# Patient Record
Sex: FEMALE | Race: WHITE | URBAN - METROPOLITAN AREA
[De-identification: names, ages, dates, MRNs, and addresses within clinical notes are randomized per-mention and may not be internally consistent; named-entity substitution may affect disease eponyms.]

---

## 2017-02-09 ENCOUNTER — HOSPITAL ENCOUNTER (OUTPATIENT)
Dept: MAMMOGRAPHY | Age: 52
Discharge: HOME | End: 2017-02-09
Attending: INTERNAL MEDICINE | Admitting: INTERNAL MEDICINE

## 2017-02-09 DIAGNOSIS — Z12.31 ENCOUNTER FOR SCREENING MAMMOGRAM FOR BREAST CANCER: ICD-10-CM

## 2017-08-21 ENCOUNTER — TELEPHONE (OUTPATIENT)
Dept: INTERNAL MEDICINE CLINIC | Age: 52
End: 2017-08-21

## 2017-10-02 ENCOUNTER — OFFICE VISIT (OUTPATIENT)
Dept: RHEUMATOLOGY | Age: 52
End: 2017-10-02

## 2017-10-02 VITALS
HEIGHT: 67 IN | SYSTOLIC BLOOD PRESSURE: 102 MMHG | BODY MASS INDEX: 20.28 KG/M2 | HEART RATE: 80 BPM | OXYGEN SATURATION: 98 % | DIASTOLIC BLOOD PRESSURE: 68 MMHG | WEIGHT: 129.2 LBS

## 2017-10-02 DIAGNOSIS — M25.50 POLYARTHRALGIA: Primary | ICD-10-CM

## 2017-10-02 DIAGNOSIS — M35.3 POLYMYALGIA RHEUMATICA (HCC): ICD-10-CM

## 2017-10-02 DIAGNOSIS — M25.50 POLYARTHRALGIA: ICD-10-CM

## 2017-10-02 LAB
A/G RATIO: 1.3 (ref 1.1–2.2)
ALBUMIN SERPL-MCNC: 4.2 G/DL (ref 3.4–5)
ALP BLD-CCNC: 73 U/L (ref 40–129)
ALT SERPL-CCNC: 12 U/L (ref 10–40)
ANION GAP SERPL CALCULATED.3IONS-SCNC: 16 MMOL/L (ref 3–16)
AST SERPL-CCNC: 21 U/L (ref 15–37)
BILIRUB SERPL-MCNC: <0.2 MG/DL (ref 0–1)
BUN BLDV-MCNC: 22 MG/DL (ref 7–20)
CALCIUM SERPL-MCNC: 9.9 MG/DL (ref 8.3–10.6)
CHLORIDE BLD-SCNC: 99 MMOL/L (ref 99–110)
CO2: 26 MMOL/L (ref 21–32)
CREAT SERPL-MCNC: 0.7 MG/DL (ref 0.6–1.1)
GFR AFRICAN AMERICAN: >60
GFR NON-AFRICAN AMERICAN: >60
GLOBULIN: 3.3 G/DL
GLUCOSE BLD-MCNC: 93 MG/DL (ref 70–99)
HEPATITIS B CORE IGM ANTIBODY: NORMAL
HEPATITIS B SURFACE ANTIGEN INTERPRETATION: NORMAL
HEPATITIS C ANTIBODY INTERPRETATION: NORMAL
POTASSIUM SERPL-SCNC: 4.4 MMOL/L (ref 3.5–5.1)
RHEUMATOID FACTOR: <10 IU/ML
SODIUM BLD-SCNC: 141 MMOL/L (ref 136–145)
TOTAL PROTEIN: 7.5 G/DL (ref 6.4–8.2)

## 2017-10-02 PROCEDURE — 99244 OFF/OP CNSLTJ NEW/EST MOD 40: CPT | Performed by: INTERNAL MEDICINE

## 2017-10-02 RX ORDER — MELOXICAM 15 MG/1
TABLET ORAL
Refills: 1 | COMMUNITY
Start: 2017-09-16 | End: 2018-05-09 | Stop reason: CLARIF

## 2017-10-02 RX ORDER — LEVOTHYROXINE SODIUM 125 MCG
TABLET ORAL
Refills: 5 | COMMUNITY
Start: 2017-09-28

## 2017-10-02 RX ORDER — PREDNISONE 1 MG/1
TABLET ORAL
Refills: 1 | COMMUNITY
Start: 2017-07-25 | End: 2018-05-07

## 2017-10-02 NOTE — PROGRESS NOTES
10/2/2017  Patient Name: Toshia Lowery  : 1965  Medical Record: [id number]    MEDICATIONS  Current Outpatient Prescriptions   Medication Sig Dispense Refill    SYNTHROID 125 MCG tablet TAKE 1 TABLET DAILY  5    meloxicam (MOBIC) 15 MG tablet TAKE 1 TABLET(S) EVERY DAY BY ORAL ROUTE.  1    predniSONE (DELTASONE) 1 MG tablet TAKE 2 TABLET(S) EVERY DAY BY ORAL ROUTE AS DIRECTED FOR 30 DAYS.  1     No current facility-administered medications for this visit. ALLERGIES  No Known Allergies      Comments  No specialty comments available. REFERRING PHYSICIAN: Glenys Sky MD    HISTORY OF PRESENT ILLNESS  Toshia Lowery is a 45 y.o. female who is being seen for follow up evaluation of  polyarthralgia and polymyalgia rheumatica. She was diagnosed with polymyalgia rheumatica 1 year ago when she presented with pain and stiffness in her whole body which was worse around shoulders and hips. She also had morning stiffness, difficulty opening doorknobs/jar cans. Symptoms were worse in her knees and legs. She denies any headache, vision changes, jaw pain or extremity weakness. She was prescribed prednisone which was slowly tapered off. She is down to prednisone 1 mg per day. She developed recurrence of symptoms with prednisone taper. She is down to prednisone 1 mg per day. She does not remember the exact dose when she noticed recurrence of symptoms. She intermittent pain in her knees and ankles. She has swelling in her ankles. 8 weeks ago she was started on meloxicam 15 mg per day by her primary care physician and was told that she has possible rheumatoid arthritis and was referred to us for further evaluation. Her symptoms have significantly improved on meloxicam.  Stiffness has resolved as well. Records from primary care physician were reviewed. She had recent lab work in 2017 which showed normal CBC, ESR and CRP.   HPI  ROS    REVIEW OF SYSTEMS: Constitutional: No unanticipated weight loss or fevers. No fatigue and malaise. Integumentary: No rash, photosensitivity, malar rash, livedo reticularis, alopecia and Raynaud's symptoms, sclerodactyly, skin tightening  Eyes: negative for dry eyes, visual disturbance and persistent redness, discharge from eyes   ENT: - No tinnitus, loss of hearing, vertigo, or recurrent ear infections.  - No history of nasal/oral ulcers. - No history of sicca symptoms. Cardiovascular: No history of pericarditis, chest pain or murmur or palpitations  Respiratory: No shortness of breath, cough or history of interstitial lung disease. No history of pleurisy. No history of tuberculosis or atypical infections. Gastrointestinal: No history of heart burn, dysphagia or esophageal dysmotility. No change in bowel habits or any inflammatory bowel disease. Genitourinary: No history renal disease, miscarriages. Hematologic/Lymphatic: No abnormal bruising or bleeding, blood clots or swollen lymph nodes. Neurological: No history seizure or focal weakness. No history of neuropathies, paresthesias or hyperesthesias, facial droop, diplopia  Psychiatric: No history of bipolar disease, anxiety, depression  Endocrine: Denies any thyroid / parathyroid disease and osteoporosis  Allergic/Immunologic: No nasal congestion or hives. I have reviewed patients Past medical History, Social History and Family History as mentioned in her chart and this remains unchanged from previous.     Past Medical History:   Diagnosis Date    Polym[facility]algia rheumatica ([facility] [address]     Thyroid disease      Past Surgical History:   Procedure Laterality Date    HERNIA REPAIR      umbilical    KNEE ARTHROSCOPY  01/17/2012    RIGHT, WITH MICROFRACTURE OF FEMORAL CHONDYLE, DEBRIDEMENT OF BONE SPURS    TONSILLECTOMY       Social History     Social History    Marital status:      Spouse name: N/A    Number of children: N/A    Years of education: N/A     Occupational History    Not on file. Social History Main Topics    Smoking status: Current Every Day Smoker     Packs/day: 1.00     Years: 20.00    Smokeless tobacco: Never Used    Alcohol use No    Drug use: No    Sexual activity: Not on file     Other Topics Concern    Not on file     Social History Narrative     Family History   Problem Relation Age of Onset    Cancer Mother     Cancer Father     Cancer Sister          PHYSICAL EXAM   Vitals:    10/02/17 1320   BP: 102/68   Pulse: 80   SpO2: 98%   Weight: 129 lb 3.2 oz (58.6 kg)   Height: 5' 7\" (1.702 m)     Physical Exam  Constitutional:  Well developed, well nourished, no acute distress, non-toxic appearance   Musculoskeletal:    RIGHT  Swell  Tender  ROM  LEFT  Swell  Tender  ROM    DIP2  0  0  Heberden   0  0  Heberden    DIP3  0  0  Heberden   0  0  Heberden    DIP4  0  0  Heberden   0  0  Heberden    DIP5  0  0  Heberden   0  0  Heberden    PIP1  0  0  FULL   0  0  FULL    PIP2  0  0  FULL   0  0  FULL    PIP3  0  0  FULL   0  0  FULL    PIP4  0  0  Bony change   0  0  FULL    PIP5  0  0  FULL   0  0  FULL    MCP1  0  0  FULL   0  0  FULL    MCP2  0  0  FULL   0  0  FULL    MCP3  0  0  FULL   0  0  FULL    MCP4  0  0  FULL   0  0  FULL    MCP5  0  0  FULL   0  0  FULL    Wrist  0  0  FULL   0  0  FULL    Elbow  0  0  FULL   0  0  FULL    Shouldr  0  0  FULL   0  0  FULL    Hip  0  0  FULL   0  0  FULL    Knee  0  0  Crepitus/varus  0  0  Crepitus/varus   Ankle  0  0  FULL   0  0  FULL    MTP1  0  0  FULL   0  0  FULL    MTP2  0  0  FULL   0  0  FULL    MTP3  0  0  FULL   0  0  FULL    MTP4  0  0  FULL   0  0  FULL    MTP5  0  0  FULL   0  0  FULL    IP1  0  0  FULL   0  0  FULL    IP2  0  0  FULL   0  0  FULL    IP3  0  0  FULL   0  0  FULL    IP4  0  0  FULL   0  0  FULL    IP5  0  0  FULL   0 0 FULL     Squaring of b/l cmc joints     Ambulates without assistance, normal gait  Neck: Full ROM, no tenderness, supple   Back- no tenderness.   Eyes:  PERRL, likely diagnosis osteoarthritis involving multiple joints. I doubt she has rheumatoid arthritis or systemic inflammatory disease.  -I will do workup to completely rule out rheumatoid arthritis  -I will check x-rays of hands and knees  -I will continue meloxicam 15 mg per day    -     XR Hand Bilateral Minimum 3 VW; Future  -     XR Knee Left Ap Lateral and Oblique; Future  -     XR Knee Right Ap Lateral and Oblique; Future  -     Cyclic Citrul Peptide Antibody, IgG; Future  -     Hepatitis B Core Antibody, IgM; Future  -     Hepatitis B Surface Antigen; Future  -     Hepatitis C Antibody; Future  -     Rheumatoid Factor; Future  -     Comprehensive Metabolic Panel; Future    Polymyalgia rheumatica ([facility] [address]- she is currently on prednisone 1 mg per day managed by her primary care physician. She denies any headaches, vision changes, jaw pain or extremity weakness. Recent ESR and CRP normal.  -She will follow up with primary care physician for further management of PMR         The patient indicates understanding of these issues and agrees with the plan. Return in about 6 weeks (around 11/13/2017). The risks and benefits of my recommendations, as well as other treatment options, benefits and side effects were discussed with the patient. All questions were answered. I reviewed patient's history, referral documents and electronic medical records  Copy of consult note is being routed electronically/faxed to referring physician         ######################################################################    I thank you for giving me the opportunity to participate in [address] [place] [address]. If you have any questions or concerns please feel free to contact me. I look forward to following  Didier Dewitt along with you. Electronically signed by: Dione Arias MD, 10/2/2017 2:09 PM    Documentation was done using voice recognition dragon software.   Every effort was made to ensure accuracy; however, inadvertent unintentional computerized transcription errors may be present.

## 2017-10-02 NOTE — MR AVS SNAPSHOT
After Visit Summary             Claudy Adan   10/2/2017 1:40 PM   Office Visit    Description:  Female : 1965   Provider:  Dione Arias MD   Department:  [facility]              Your Follow-Up and Future Appointments         Below is a list of your follow-up and future appointments. This may not be a complete list as you may have made appointments directly with providers that we are not aware of or your providers may have made some for you. Please call your providers to confirm appointments. It is important to keep your appointments. Please bring your current insurance card, photo ID, co-pay, and all medication bottles to your appointment. If self-pay, payment is expected at the time of service. Your To-Do List     Future Appointments Provider Department Dept Phone    2017 1:40 PM Dione Arias MD [facility] [phone]    Please arrive 15 minutes prior to appointment time, bring insurance card and photo ID. Future Orders Complete By Expires    Comprehensive Metabolic Panel [OBW47 Custom]  10/2/2017     Cyclic Citrul Peptide Antibody, IgG [ZLG176 Custom]  10/2/2017 10/3/2018    Hepatitis B Core Antibody, IgM [EGS220 Custom]  10/2/2017 10/2/2018    Hepatitis B Surface Antigen [AGX499 Custom]  10/2/2017 10/2/2018    Hepatitis C Antibody [VWQ745 Custom]  10/2/2017 10/2/2018    Rheumatoid Factor [DJK554 Custom]  10/2/2017 10/3/2018    XR Hand Bilateral Minimum 3 VW [FGH5666 Custom]  10/2/2017 10/3/2018    XR Knee Left Ap Lateral and Oblique [PSR0172 Custom]  10/2/2017 10/3/2018    XR Knee Right Ap Lateral and Oblique [YPL7296 Custom]  10/2/2017 10/3/2018    Follow-Up    Return in about 6 weeks (around 2017).          Information from Your Visit        Department     Name Address Phone Fax    [facility] [address] [place] [address] [address] [phone]      You Were Seen for: Comments    Polyarthralgia   [124117]         Vital Signs     Blood Pressure Pulse Height Weight Oxygen Saturation Body Mass Index    102/68 80 5' 7\" (1.702 m) 129 lb 3.2 oz (58.6 kg) 98% 20.24 kg/m2    Smoking Status                   Current Every Day Smoker           Instructions    Continue meloxicam   Check labs and xrays               Today's Medication Changes          These changes are accurate as of: 10/2/17  2:26 PM.  If you have any questions, ask your nurse or doctor. CHANGE how you take these medications           SYNTHROID 125 MCG tablet   Instructions:  TAKE 1 TABLET DAILY   Refills:  5   Generic drug:  levothyroxine   What changed:  Another medication with the same name was removed. Continue taking this medication, and follow the directions you see here. Changed by:  Douglas Albert MD         STOP taking these medications           ibuprofen 600 MG tablet   Commonly known as:  ADVIL;MOTRIN   Stopped by:  Douglas Albert MD               Your Current Medications Are              SYNTHROID 125 MCG tablet TAKE 1 TABLET DAILY    meloxicam (MOBIC) 15 MG tablet TAKE 1 TABLET(S) EVERY DAY BY ORAL ROUTE.    predniSONE (DELTASONE) 1 MG tablet TAKE 2 TABLET(S) EVERY DAY BY ORAL ROUTE AS DIRECTED FOR 30 DAYS. Allergies           No Known Allergies         Additional Information        Basic Information     Date Of Birth Sex Race Ethnicity Preferred Language    1965 Female White Non-/Non  English      Immunizations as of 10/2/2017     Name Date    Influenza Nasal 10/10/2011      Preventive Care        Date Due    Hepatitis C screening is recommended for all adults regardless of risk factors born between [facility] at least once (lifetime) who have never been tested. 1965    HIV screening is recommended for all people regardless of risk factors  aged 15-65 years at least once (lifetime) who have never been HIV tested.  3/17/1978

## 2017-10-04 LAB — CCP IGG ANTIBODIES: 24 UNITS (ref 0–19)

## 2017-10-05 ENCOUNTER — HOSPITAL ENCOUNTER (OUTPATIENT)
Dept: OTHER | Age: 52
Discharge: HOME | End: 2017-10-05
Attending: INTERNAL MEDICINE | Admitting: INTERNAL MEDICINE

## 2017-10-05 DIAGNOSIS — M25.50 POLYARTHRALGIA: ICD-10-CM

## 2017-10-05 NOTE — PROGRESS NOTES
Please call the patient and let her know that x-rays are consistent with osteoarthritis.   Continue same management

## 2017-11-13 ENCOUNTER — HOSPITAL ENCOUNTER (OUTPATIENT)
Dept: GENERAL RADIOLOGY | Age: 52
Discharge: HOME | End: 2017-11-13
Attending: INTERNAL MEDICINE | Admitting: INTERNAL MEDICINE

## 2017-11-13 DIAGNOSIS — Z13.820 ENCOUNTER FOR SCREENING FOR OSTEOPOROSIS: ICD-10-CM

## 2017-11-13 DIAGNOSIS — Z13.820 OSTEOPOROSIS SCREENING: ICD-10-CM

## 2018-02-13 ENCOUNTER — HOSPITAL ENCOUNTER (OUTPATIENT)
Dept: MAMMOGRAPHY | Age: 53
Discharge: HOME | End: 2018-02-13
Attending: INTERNAL MEDICINE | Admitting: INTERNAL MEDICINE

## 2018-02-13 DIAGNOSIS — Z12.31 ENCOUNTER FOR SCREENING MAMMOGRAM FOR BREAST CANCER: ICD-10-CM

## 2018-02-23 ENCOUNTER — HOSPITAL ENCOUNTER (OUTPATIENT)
Dept: OTHER | Age: 53
Discharge: HOME | End: 2018-02-23
Attending: INTERNAL MEDICINE | Admitting: INTERNAL MEDICINE

## 2018-02-23 DIAGNOSIS — R52 PAIN: ICD-10-CM

## 2018-03-14 ENCOUNTER — OFFICE VISIT (OUTPATIENT)
Dept: ORTHOPEDIC SURGERY | Age: 53
End: 2018-03-14

## 2018-03-14 VITALS — WEIGHT: 129.19 LBS | HEIGHT: 67 IN | BODY MASS INDEX: 20.28 KG/M2

## 2018-03-14 DIAGNOSIS — M19.019 ACROMIOCLAVICULAR JOINT ARTHRITIS: Primary | ICD-10-CM

## 2018-03-14 PROCEDURE — 99204 OFFICE O/P NEW MOD 45 MIN: CPT | Performed by: ORTHOPAEDIC SURGERY

## 2018-03-14 NOTE — PROGRESS NOTES
this time  2. PT - in the future  3. Further imaging - MRI right shoulder  4. Follow up - after MRI      [facility]  Board Certified  Ann and Sergio Team Physician

## 2018-04-11 ENCOUNTER — OFFICE VISIT (OUTPATIENT)
Dept: ORTHOPEDIC SURGERY | Age: 53
End: 2018-04-11

## 2018-04-11 VITALS — WEIGHT: 129 LBS | BODY MASS INDEX: 20.25 KG/M2 | HEIGHT: 67 IN

## 2018-04-11 DIAGNOSIS — M19.019 ACROMIOCLAVICULAR JOINT ARTHRITIS: ICD-10-CM

## 2018-04-11 DIAGNOSIS — M75.111 INCOMPLETE TEAR OF RIGHT ROTATOR CUFF: ICD-10-CM

## 2018-04-11 DIAGNOSIS — M75.41 SUBACROMIAL IMPINGEMENT, RIGHT: Primary | ICD-10-CM

## 2018-04-11 PROCEDURE — 99214 OFFICE O/P EST MOD 30 MIN: CPT | Performed by: ORTHOPAEDIC SURGERY

## 2018-04-25 ENCOUNTER — TELEPHONE (OUTPATIENT)
Dept: ORTHOPEDIC SURGERY | Age: 53
End: 2018-04-25

## 2018-05-02 ENCOUNTER — TELEPHONE (OUTPATIENT)
Dept: ORTHOPEDIC SURGERY | Age: 53
End: 2018-05-02

## 2018-05-03 ENCOUNTER — TELEPHONE (OUTPATIENT)
Dept: ORTHOPEDIC SURGERY | Age: 53
End: 2018-05-03

## 2018-05-09 DIAGNOSIS — M75.41 SUBACROMIAL IMPINGEMENT, RIGHT: ICD-10-CM

## 2018-05-09 DIAGNOSIS — M19.019 ACROMIOCLAVICULAR JOINT ARTHRITIS: ICD-10-CM

## 2018-05-09 DIAGNOSIS — M75.111 INCOMPLETE TEAR OF RIGHT ROTATOR CUFF: Primary | ICD-10-CM

## 2018-05-09 RX ORDER — OXYCODONE HYDROCHLORIDE 10 MG/1
10 TABLET ORAL EVERY 8 HOURS PRN
Qty: 21 TABLET | Refills: 0 | Status: SHIPPED | OUTPATIENT
Start: 2018-05-18 | End: 2018-05-25

## 2018-05-09 RX ORDER — MELOXICAM 15 MG/1
15 TABLET ORAL DAILY
Qty: 30 TABLET | Refills: 3 | Status: SHIPPED | OUTPATIENT
Start: 2018-05-18

## 2018-05-09 RX ORDER — MELOXICAM 15 MG/1
15 TABLET ORAL DAILY
Qty: 30 TABLET | Refills: 3 | Status: SHIPPED | OUTPATIENT
Start: 2018-05-18 | End: 2018-05-09 | Stop reason: CLARIF

## 2018-05-09 RX ORDER — OXYCODONE HYDROCHLORIDE 10 MG/1
10 TABLET ORAL EVERY 8 HOURS PRN
Qty: 21 TABLET | Refills: 0 | Status: SHIPPED | OUTPATIENT
Start: 2018-05-18 | End: 2018-05-09 | Stop reason: CLARIF

## 2018-05-18 ENCOUNTER — HOSPITAL ENCOUNTER (OUTPATIENT)
Dept: SURGERY | Age: 53
Discharge: HOME | End: 2018-05-18
Attending: ORTHOPAEDIC SURGERY | Admitting: ORTHOPAEDIC SURGERY

## 2018-05-18 VITALS
TEMPERATURE: 97.4 F | WEIGHT: 136.19 LBS | RESPIRATION RATE: 15 BRPM | SYSTOLIC BLOOD PRESSURE: 95 MMHG | DIASTOLIC BLOOD PRESSURE: 67 MMHG | OXYGEN SATURATION: 96 % | HEIGHT: 66 IN | HEART RATE: 67 BPM | BODY MASS INDEX: 21.89 KG/M2

## 2018-05-18 RX ORDER — SODIUM CHLORIDE, SODIUM LACTATE, POTASSIUM CHLORIDE, CALCIUM CHLORIDE 600; 310; 30; 20 MG/100ML; MG/100ML; MG/100ML; MG/100ML
INJECTION, SOLUTION INTRAVENOUS CONTINUOUS
Status: DISCONTINUED | OUTPATIENT
Start: 2018-05-18 | End: 2018-05-19 | Stop reason: HOSPADM

## 2018-05-18 RX ORDER — HYDRALAZINE HYDROCHLORIDE 20 MG/ML
5 INJECTION INTRAMUSCULAR; INTRAVENOUS EVERY 10 MIN PRN
Status: DISCONTINUED | OUTPATIENT
Start: 2018-05-18 | End: 2018-05-19 | Stop reason: HOSPADM

## 2018-05-18 RX ORDER — MEPERIDINE HYDROCHLORIDE 25 MG/ML
12.5 INJECTION INTRAMUSCULAR; INTRAVENOUS; SUBCUTANEOUS EVERY 5 MIN PRN
Status: DISCONTINUED | OUTPATIENT
Start: 2018-05-18 | End: 2018-05-19 | Stop reason: HOSPADM

## 2018-05-18 RX ORDER — ACETAMINOPHEN 325 MG/1
650 TABLET ORAL EVERY 4 HOURS PRN
Status: DISCONTINUED | OUTPATIENT
Start: 2018-05-18 | End: 2018-05-19 | Stop reason: HOSPADM

## 2018-05-18 RX ORDER — CEFAZOLIN SODIUM 2 G/100ML
2 INJECTION, SOLUTION INTRAVENOUS
Status: COMPLETED | OUTPATIENT
Start: 2018-05-18 | End: 2018-05-18

## 2018-05-18 RX ORDER — LIDOCAINE HYDROCHLORIDE 10 MG/ML
1 INJECTION, SOLUTION EPIDURAL; INFILTRATION; INTRACAUDAL; PERINEURAL
Status: ACTIVE | OUTPATIENT
Start: 2018-05-18 | End: 2018-05-18

## 2018-05-18 RX ORDER — SODIUM CHLORIDE 0.9 % (FLUSH) 0.9 %
10 SYRINGE (ML) INJECTION PRN
Status: DISCONTINUED | OUTPATIENT
Start: 2018-05-18 | End: 2018-05-19 | Stop reason: HOSPADM

## 2018-05-18 RX ORDER — LABETALOL HYDROCHLORIDE 5 MG/ML
5 INJECTION, SOLUTION INTRAVENOUS EVERY 10 MIN PRN
Status: DISCONTINUED | OUTPATIENT
Start: 2018-05-18 | End: 2018-05-19 | Stop reason: HOSPADM

## 2018-05-18 RX ORDER — OXYCODONE HYDROCHLORIDE AND ACETAMINOPHEN 5; 325 MG/1; MG/1
1 TABLET ORAL
Status: COMPLETED | OUTPATIENT
Start: 2018-05-18 | End: 2018-05-18

## 2018-05-18 RX ORDER — SODIUM CHLORIDE 0.9 % (FLUSH) 0.9 %
10 SYRINGE (ML) INJECTION EVERY 12 HOURS SCHEDULED
Status: DISCONTINUED | OUTPATIENT
Start: 2018-05-18 | End: 2018-05-19 | Stop reason: HOSPADM

## 2018-05-18 RX ORDER — HYDROMORPHONE HCL 110MG/55ML
0.5 PATIENT CONTROLLED ANALGESIA SYRINGE INTRAVENOUS EVERY 5 MIN PRN
Status: DISCONTINUED | OUTPATIENT
Start: 2018-05-18 | End: 2018-05-19 | Stop reason: HOSPADM

## 2018-05-18 RX ORDER — ONDANSETRON 2 MG/ML
4 INJECTION INTRAMUSCULAR; INTRAVENOUS
Status: ACTIVE | OUTPATIENT
Start: 2018-05-18 | End: 2018-05-18

## 2018-05-18 RX ADMIN — SODIUM CHLORIDE, SODIUM LACTATE, POTASSIUM CHLORIDE, CALCIUM CHLORIDE: 600; 310; 30; 20 INJECTION, SOLUTION INTRAVENOUS at 06:23

## 2018-05-18 RX ADMIN — SODIUM CHLORIDE, SODIUM LACTATE, POTASSIUM CHLORIDE, CALCIUM CHLORIDE: 600; 310; 30; 20 INJECTION, SOLUTION INTRAVENOUS at 08:52

## 2018-05-18 RX ADMIN — OXYCODONE HYDROCHLORIDE AND ACETAMINOPHEN 1 TABLET: 5; 325 TABLET ORAL at 09:30

## 2018-05-18 RX ADMIN — CEFAZOLIN SODIUM 2 G: 2 INJECTION, SOLUTION INTRAVENOUS at 06:56

## 2018-05-18 RX ADMIN — Medication 0.5 MG: at 08:51

## 2018-05-18 ASSESSMENT — PAIN SCALES - GENERAL
PAINLEVEL_OUTOF10: 5
PAINLEVEL_OUTOF10: 3
PAINLEVEL_OUTOF10: 4

## 2018-05-18 ASSESSMENT — PAIN DESCRIPTION - ORIENTATION: ORIENTATION: RIGHT

## 2018-05-18 ASSESSMENT — PAIN DESCRIPTION - LOCATION: LOCATION: SHOULDER

## 2018-05-18 ASSESSMENT — PAIN - FUNCTIONAL ASSESSMENT: PAIN_FUNCTIONAL_ASSESSMENT: 0-10

## 2018-05-18 ASSESSMENT — LIFESTYLE VARIABLES: SMOKING_STATUS: 1

## 2018-05-23 ENCOUNTER — OFFICE VISIT (OUTPATIENT)
Dept: ORTHOPEDIC SURGERY | Age: 53
End: 2018-05-23

## 2018-05-23 VITALS — BODY MASS INDEX: 21.97 KG/M2 | HEIGHT: 66 IN | WEIGHT: 136.69 LBS

## 2018-05-23 DIAGNOSIS — M25.511 ACUTE PAIN OF RIGHT SHOULDER: Primary | ICD-10-CM

## 2018-05-23 PROCEDURE — 99024 POSTOP FOLLOW-UP VISIT: CPT | Performed by: ORTHOPAEDIC SURGERY

## 2018-05-29 ENCOUNTER — HOSPITAL ENCOUNTER (OUTPATIENT)
Dept: PHYSICAL THERAPY | Age: 53
Discharge: HOME | End: 2018-05-31
Admitting: ORTHOPAEDIC SURGERY

## 2018-05-31 ENCOUNTER — HOSPITAL ENCOUNTER (OUTPATIENT)
Dept: PHYSICAL THERAPY | Age: 53
Discharge: HOME | End: 2018-06-30
Admitting: ORTHOPAEDIC SURGERY

## 2018-06-01 ENCOUNTER — HOSPITAL ENCOUNTER (OUTPATIENT)
Dept: OTHER | Age: 53
Discharge: HOME | End: 2018-06-01
Attending: ORTHOPAEDIC SURGERY | Admitting: ORTHOPAEDIC SURGERY

## 2018-06-05 ENCOUNTER — HOSPITAL ENCOUNTER (OUTPATIENT)
Dept: PHYSICAL THERAPY | Age: 53
Discharge: HOME | End: 2018-06-06
Admitting: ORTHOPAEDIC SURGERY

## 2018-06-07 ENCOUNTER — TELEPHONE (OUTPATIENT)
Dept: ORTHOPEDIC SURGERY | Age: 53
End: 2018-06-07

## 2018-06-07 ENCOUNTER — HOSPITAL ENCOUNTER (OUTPATIENT)
Dept: PHYSICAL THERAPY | Age: 53
Discharge: HOME | End: 2018-06-08
Admitting: ORTHOPAEDIC SURGERY

## 2018-06-12 ENCOUNTER — HOSPITAL ENCOUNTER (OUTPATIENT)
Dept: PHYSICAL THERAPY | Age: 53
Discharge: HOME | End: 2018-06-13
Admitting: ORTHOPAEDIC SURGERY

## 2018-06-14 ENCOUNTER — HOSPITAL ENCOUNTER (OUTPATIENT)
Dept: PHYSICAL THERAPY | Age: 53
Discharge: HOME | End: 2018-06-15
Admitting: ORTHOPAEDIC SURGERY

## 2018-06-19 ENCOUNTER — HOSPITAL ENCOUNTER (OUTPATIENT)
Dept: PHYSICAL THERAPY | Age: 53
Discharge: HOME | End: 2018-06-20
Admitting: ORTHOPAEDIC SURGERY

## 2018-06-20 ENCOUNTER — OFFICE VISIT (OUTPATIENT)
Dept: ORTHOPEDIC SURGERY | Age: 53
End: 2018-06-20

## 2018-06-20 VITALS — BODY MASS INDEX: 22.04 KG/M2 | HEIGHT: 66 IN | WEIGHT: 137.13 LBS

## 2018-06-20 DIAGNOSIS — M75.41 SUBACROMIAL IMPINGEMENT, RIGHT: Primary | ICD-10-CM

## 2018-06-20 DIAGNOSIS — M75.111 INCOMPLETE TEAR OF RIGHT ROTATOR CUFF: ICD-10-CM

## 2018-06-20 DIAGNOSIS — M19.019 ACROMIOCLAVICULAR JOINT ARTHRITIS: ICD-10-CM

## 2018-06-20 PROCEDURE — 99024 POSTOP FOLLOW-UP VISIT: CPT | Performed by: ORTHOPAEDIC SURGERY

## 2018-06-21 ENCOUNTER — HOSPITAL ENCOUNTER (OUTPATIENT)
Dept: PHYSICAL THERAPY | Age: 53
Discharge: HOME | End: 2018-06-22
Admitting: ORTHOPAEDIC SURGERY

## 2018-06-26 ENCOUNTER — HOSPITAL ENCOUNTER (OUTPATIENT)
Dept: PHYSICAL THERAPY | Age: 53
Discharge: HOME | End: 2018-06-27
Admitting: ORTHOPAEDIC SURGERY

## 2018-06-28 ENCOUNTER — HOSPITAL ENCOUNTER (OUTPATIENT)
Dept: PHYSICAL THERAPY | Age: 53
Discharge: HOME | End: 2018-06-29
Admitting: ORTHOPAEDIC SURGERY

## 2018-07-01 ENCOUNTER — HOSPITAL ENCOUNTER (OUTPATIENT)
Dept: OTHER | Age: 53
Discharge: HOME | End: 2018-07-31
Attending: ORTHOPAEDIC SURGERY | Admitting: ORTHOPAEDIC SURGERY

## 2018-07-03 ENCOUNTER — HOSPITAL ENCOUNTER (OUTPATIENT)
Dept: PHYSICAL THERAPY | Age: 53
Discharge: HOME | End: 2018-07-04
Admitting: ORTHOPAEDIC SURGERY

## 2018-07-03 NOTE — FLOWSHEET NOTE
[address] [place] [address]  Phone: [phone] Fax: [phone]      Physical Therapy Daily Treatment Note  Date:  7/3/2018    Patient Name:  Eri Green    :  1965  MRN: [id number]  Restrictions/Precautions:    Medical/Treatment Diagnosis Information:  · Diagnosis: Acute pain of right shoulder  - Primary   · Treatment Diagnosis: Acute pain of right shoulder  - Primary   Insurance/Certification information:     Physician Information:  Referring Practitioner: Dr Garo Rios of care signed (Y/N):     Date of Patient follow up with Physician:     G-Code (if applicable):      Date G-Code Applied:    PT G-Codes  Functional Assessment Tool Used: DASH  Score: 72  Functional Limitation: Carrying, moving and handling objects  Carrying, Moving and Handling Objects Current Status (): At least 60 percent but less than 80 percent impaired, limited or restricted  Carrying, Moving and Handling Objects Goal Status (): At least 20 percent but less than 40 percent impaired, limited or restricted    Progress Note: [x]  Yes  []  No  Next due by: Visit #10      Latex Allergy:  [x]NO      []YES  Preferred Language for Healthcare:   [x]English       []other:    Visit #u   Insurance Allowable   10 20     Pain level:  0-4/10     SUBJECTIVE:  Patient reports doing well, no ache with sleeping.          OBJECTIVE:   Observation:   Test measurements:  Active flexion 130 w compensatory hike,  PROM flex 145, ABd 120, ER 60, IR 30    RESTRICTIONS/PRECAUTIONS: R shoulder scope, aug repair, DCE, SAD 18    Exercises/Interventions:   Therapeutic Ex Sets/sec Reps Notes   UBE 5 min     Cane AAROM flex/press 1 20 Wand ER/flex   3 way Isomet T- band Row/pinch 1 20 2.5 pl   T- band lower pinch      T- band ER/IR activation 2 12 Red/blue   Supine balance point 30 sec 3 Red KB   SL ER/punch 3 8 1-0 lb   Prone Rows/HAB/ext      Seat Table slides/ 1 15 Elevated/ bilateral   Seated HH  Depression      Biceps/Tri 1 15 8 lb/blue   IR strap st 30 sec 3    Ball rolls 90 deg Ball codmans    Lawnmower 1 20 5 lb   Multi angle body on arm flexion/abd 1 15          Scaption 60 deg 1 10    Manual Intervention      Shld /GH Mobs      Post Cap mobs 5 min  Grade III/IV   Thoracic/Rib manipualtion      CT MT/Mobs      PROM MT 10 min  R UE   Elbow mobs            NMR re-education      T-spine Ext      GH depress/compress      Scap/GH NMR      Body blade      Wall ball roll      Wall Ball bounce      Ball drops      Nikole Scap Bio          Therapeutic Exercise and NMR EXR  [x] (14273) Provided verbal/tactile cueing for activities related to strengthening, flexibility, endurance, ROM  for improvements in scapular, scapulothoracic and UE control with self care, reaching, carrying, lifting, house/yardwork, driving/computer work.    [] (48639) Provided verbal/tactile cueing for activities related to improving balance, coordination, kinesthetic sense, posture, motor skill, proprioception  to assist with  scapular, scapulothoracic and UE control with self care, reaching, carrying, lifting, house/yardwork, driving/computer work. Therapeutic Activities:    [x] (44793 or 14359) Provided verbal/tactile cueing for activities related to improving balance, coordination, kinesthetic sense, posture, motor skill, proprioception and motor activation to allow for proper function of scapular, scapulothoracic and UE control with self care, carrying, lifting, driving/computer work.      Home Exercise Program:    [x] (40860) Reviewed/Progressed HEP activities related to strengthening, flexibility, endurance, ROM of scapular, scapulothoracic and UE control with self care, reaching, carrying, lifting, house/yardwork, driving/computer work  [] (27721) Reviewed/Progressed HEP activities related to improving balance, coordination, kinesthetic sense, posture, motor skill, proprioception of scapular, scapulothoracic and UE control with self care, reaching, carrying, lifting, house/yardwork, driving/computer work      Manual Treatments:  PROM / STM / Oscillations-Mobs:  G-I, II, III, IV (PA's, Inf., Post.)  [x] (43430) Provided manual therapy to mobilize soft tissue/joints of cervical/CT, scapular GHJ and UE for the purpose of modulating pain, promoting relaxation,  increasing ROM, reducing/eliminating soft tissue swelling/inflammation/restriction, improving soft tissue extensibility and allowing for proper ROM for normal function with self care, reaching, carrying, lifting, house/yardwork, driving/computer work    Modalities:      Charges:  Timed Code Treatment Minutes: 55   Total Treatment Minutes: 55     [] EVAL (LOW) 01994 (typically 20 minutes face-to-face)  [] EVAL (MOD) 77657 (typically 30 minutes face-to-face)  [] EVAL (HIGH) 57769 (typically 45 minutes face-to-face)  [] RE-EVAL     [x] ZO(40392) x  3   [] IONTO  [] NMR (22565) x      [] VASO  [x] Manual (12872) x  1    [] Other:  [] TA x       [] Mech Traction (84468)  [] ES(attended) (69965)      [] ES (un) (47025):     GOALS:      GOALS:  Patient stated goal: back to normal (work and bike)    Therapist goals for Patient:   Short Term Goals: To be achieved in: 2 weeks  1. Independent in HEP and progression per patient tolerance, in order to prevent re-injury. 2. Patient will have a decrease in pain to facilitate improvement in movement, function, and ADLs as indicated by Functional Deficits. Long Term Goals: To be achieved in: 8 weeks  1. Disability index score of 40% or less for the DASH to assist with reaching prior level of function. 2. Patient will demonstrate increased AROM to [facility] to allow for proper joint functioning as indicated by patients Functional Deficits.    3. Patient will demonstrate an increase in Strength to good scapular and core control, w/in 5lbs HHD for UE to allow for proper functional mobility as indicated by patients

## 2018-07-05 ENCOUNTER — HOSPITAL ENCOUNTER (OUTPATIENT)
Dept: PHYSICAL THERAPY | Age: 53
Discharge: HOME | End: 2018-07-06
Admitting: ORTHOPAEDIC SURGERY

## 2018-07-05 NOTE — PLAN OF CARE
Exercise and NMR EXR  [x] (37754) Provided verbal/tactile cueing for activities related to strengthening, flexibility, endurance, ROM  for improvements in scapular, scapulothoracic and UE control with self care, reaching, carrying, lifting, house/yardwork, driving/computer work.    [] (53115) Provided verbal/tactile cueing for activities related to improving balance, coordination, kinesthetic sense, posture, motor skill, proprioception  to assist with  scapular, scapulothoracic and UE control with self care, reaching, carrying, lifting, house/yardwork, driving/computer work. Therapeutic Activities:    [x] (42243 or 13986) Provided verbal/tactile cueing for activities related to improving balance, coordination, kinesthetic sense, posture, motor skill, proprioception and motor activation to allow for proper function of scapular, scapulothoracic and UE control with self care, carrying, lifting, driving/computer work.      Home Exercise Program:    [x] (95815) Reviewed/Progressed HEP activities related to strengthening, flexibility, endurance, ROM of scapular, scapulothoracic and UE control with self care, reaching, carrying, lifting, house/yardwork, driving/computer work  [] (72052) Reviewed/Progressed HEP activities related to improving balance, coordination, kinesthetic sense, posture, motor skill, proprioception of scapular, scapulothoracic and UE control with self care, reaching, carrying, lifting, house/yardwork, driving/computer work      Manual Treatments:  PROM / STM / Oscillations-Mobs:  G-I, II, III, IV (PA's, Inf., Post.)  [x] (87826) Provided manual therapy to mobilize soft tissue/joints of cervical/CT, scapular GHJ and UE for the purpose of modulating pain, promoting relaxation,  increasing ROM, reducing/eliminating soft tissue swelling/inflammation/restriction, improving soft tissue extensibility and allowing for proper ROM for normal function with self care, reaching, carrying, lifting, house/yardwork,

## 2018-07-10 ENCOUNTER — HOSPITAL ENCOUNTER (OUTPATIENT)
Dept: PHYSICAL THERAPY | Age: 53
Discharge: HOME | End: 2018-07-11
Admitting: ORTHOPAEDIC SURGERY

## 2018-07-10 NOTE — FLOWSHEET NOTE
[address] [place], [place] [address] [address]  Phone: [phone] Fax: [phone]            Physical Therapy Daily Treatment Note  Date:  7/10/2018    Patient Name:  Bridget Canseco    :  1965  MRN: [id number]  Restrictions/Precautions:    Medical/Treatment Diagnosis Information:  · Diagnosis: Acute pain of right shoulder  - Primary   · Treatment Diagnosis: Acute pain of right shoulder  - Primary   Insurance/Certification information:     Physician Information:  Referring Practitioner: Dr Antonio Jackson of care signed (Y/N):     Date of Patient follow up with Physician:     G-Code (if applicable):      Date G-Code Applied:    PT G-Codes  Functional Assessment Tool Used: DASH  Score: 11  Functional Limitation: Carrying, moving and handling objects  Carrying, Moving and Handling Objects Current Status (): At least 20 percent but less than 0 percent impaired, limited or restricted  Carrying, Moving and Handling Objects Goal Status (): At least 20 percent but less than 0 percent impaired, limited or restricted    Progress Note: [x]  Yes  []  No  Next due by: Visit #10      Latex Allergy:  [x]NO      []YES  Preferred Language for Healthcare:   [x]English       []other:    Visit #u   Insurance Allowable   12 20     Pain level:  0-4/10     SUBJECTIVE:  Patient reports doing well, no ache with sleeping. Mild soreness after last session. Shoulder continues to feel stiff in am, function improving everyday.   IR slowly improving        OBJECTIVE:   Observation:   Test measurements:  Active flexion 150 w compensatory hike,  PROM flex 155, ABd 120, ER 80, IR 34, functional IR to L3    RESTRICTIONS/PRECAUTIONS: R shoulder scope, aug repair, DCE, SAD 18    Exercises/Interventions:   Therapeutic Ex Sets/sec Reps Notes   UBE 5 min     Cane AAROM flex/press 1 20 Wand ER/flex   3 way Isomet T- band Row/pinch 1 20 3.0 pl   T- band lower pinch T- band ER/IR activation 2 12 Red/blue   Supine balance point 60 sec 3 Red KB   SL ER/punch 3 8 2-0 lb   Prone Rows/HAB/ext      Seat Table slides/ 1 15 Elevated/ bilateral   Standing HAB 2 12 red   Biceps/Tri 1 15 8 lb/blue   IR strap st 30 sec 3    Ball rolls 90 deg Ball codmans    Lawnmower 1 20 5 lb   Multi angle body on arm flexion/abd 1 15    Tx open book st 1 10    Scaption/abduction  1 10    Manual Intervention      Shld /GH Mobs      Post Cap mobs 6 min  Grade III/IV   Thoracic/Rib manipualtion      CT MT/Mobs      PROM MT 11 min  R UE   Elbow mobs            NMR re-education      T-spine Ext      GH depress/compress      Scap/GH NMR      Body blade      Wall ball roll      Wall Ball bounce      Ball drops      Nikole Scap Bio          Therapeutic Exercise and NMR EXR  [x] (46983) Provided verbal/tactile cueing for activities related to strengthening, flexibility, endurance, ROM  for improvements in scapular, scapulothoracic and UE control with self care, reaching, carrying, lifting, house/yardwork, driving/computer work.    [] (84927) Provided verbal/tactile cueing for activities related to improving balance, coordination, kinesthetic sense, posture, motor skill, proprioception  to assist with  scapular, scapulothoracic and UE control with self care, reaching, carrying, lifting, house/yardwork, driving/computer work. Therapeutic Activities:    [x] (99884 or 48994) Provided verbal/tactile cueing for activities related to improving balance, coordination, kinesthetic sense, posture, motor skill, proprioception and motor activation to allow for proper function of scapular, scapulothoracic and UE control with self care, carrying, lifting, driving/computer work.      Home Exercise Program:    [x] (10960) Reviewed/Progressed HEP activities related to strengthening, flexibility, endurance, ROM of scapular, scapulothoracic and UE control with self care, reaching, carrying, lifting, house/yardwork,

## 2018-07-12 ENCOUNTER — HOSPITAL ENCOUNTER (OUTPATIENT)
Dept: PHYSICAL THERAPY | Age: 53
Discharge: HOME | End: 2018-07-13
Admitting: ORTHOPAEDIC SURGERY

## 2018-07-12 NOTE — FLOWSHEET NOTE
[address] [place] [address] Timo Jeronimo  Phone: [phone] Fax: [phone]            Physical Therapy Daily Treatment Note  Date:  2018    Patient Name:  Darylene Rancher    :  1965  MRN: [id number]  Restrictions/Precautions:    Medical/Treatment Diagnosis Information:  · Diagnosis: Acute pain of right shoulder  - Primary   · Treatment Diagnosis: Acute pain of right shoulder  - Primary   Insurance/Certification information:     Physician Information:  Referring Practitioner: Dr Fariba Flannery of care signed (Y/N):     Date of Patient follow up with Physician:     G-Code (if applicable):      Date G-Code Applied:    PT G-Codes  Functional Assessment Tool Used: DASH  Score: 11  Functional Limitation: Carrying, moving and handling objects  Carrying, Moving and Handling Objects Current Status (): At least 20 percent but less than 0 percent impaired, limited or restricted  Carrying, Moving and Handling Objects Goal Status (): At least 20 percent but less than 0 percent impaired, limited or restricted    Progress Note: [x]  Yes  []  No  Next due by: Visit #10      Latex Allergy:  [x]NO      []YES  Preferred Language for Healthcare:   [x]English       []other:    Visit #u   Insurance Allowable   13 20     Pain level:  0-4/10     SUBJECTIVE:  Patient reports doing well, no ache with sleeping. Decreased ache overall.       OBJECTIVE:   Observation:   Test measurements:  Active flexion 150 w compensatory hike,  PROM flex 155, ABd 120, ER 80, IR 34, functional IR to L3    RESTRICTIONS/PRECAUTIONS: R shoulder scope, aug repair, DCE, SAD 18    Exercises/Interventions:   Therapeutic Ex Sets/sec Reps Notes   UBE 5 min     Cane AAROM flex/press 1 20 Wand ER/flex   3 way Isomet T- band Row/pinch 1 20 3.0 pl   T- band lower pinch      T- band ER/IR activation 2 12 Red/blue   Supine balance point 60 sec 3 Red KB   SL ER/punch 3 8 2-0 lb   Prone Rows/HAB/ext      Seat Table slides/ 1 15 Elevated/ bilateral   Standing HAB 2 12 red   Biceps/Tri 1 15 8 lb/blue   IR strap st 30 sec 3    Ball rolls 90 deg Ball SpaceIL    Lawnmower 1 20 5 lb   Multi angle body on arm flexion/abd 1 15    Tx open book st 1 10    tx clock rotion 1 6 cw/ccw   Quad flex/hab 2 5 ea   Scaption/abduction  1 10    Manual Intervention      Shld /GH Mobs      Post Cap mobs 3 min  Grade III/IV   Thoracic/Rib manipualtion      CT MT/Mobs      PROM MT 10min  R UE   Elbow mobs            NMR re-education      T-spine Ext      GH depress/compress      Scap/GH NMR      Body blade      Wall ball roll      Wall Ball bounce      Ball drops      Nikole Scap Bio          Therapeutic Exercise and NMR EXR  [x] (80683) Provided verbal/tactile cueing for activities related to strengthening, flexibility, endurance, ROM  for improvements in scapular, scapulothoracic and UE control with self care, reaching, carrying, lifting, house/yardwork, driving/computer work.    [] (03627) Provided verbal/tactile cueing for activities related to improving balance, coordination, kinesthetic sense, posture, motor skill, proprioception  to assist with  scapular, scapulothoracic and UE control with self care, reaching, carrying, lifting, house/yardwork, driving/computer work. Therapeutic Activities:    [x] (61894 or 56242) Provided verbal/tactile cueing for activities related to improving balance, coordination, kinesthetic sense, posture, motor skill, proprioception and motor activation to allow for proper function of scapular, scapulothoracic and UE control with self care, carrying, lifting, driving/computer work.      Home Exercise Program:    [x] (26251) Reviewed/Progressed HEP activities related to strengthening, flexibility, endurance, ROM of scapular, scapulothoracic and UE control with self care, reaching, carrying, lifting, house/yardwork, driving/computer work  [] (53211) Reviewed/Progressed HEP Strength to good scapular and core control, w/in 5lbs HHD for UE to allow for proper functional mobility as indicated by patients Functional Deficits. 75% MET  4. Patient will return to reaching functional activities without increased symptoms or restriction. 75% MET  5. Return to work(patient specific functional goal)  Not MET       Progression Towards Functional goals:  [x] Patient is progressing as expected towards functional goals listed. [] Progression is slowed due to complexities listed. [] Progression has been slowed due to co-morbidities. [] Plan just implemented, too soon to assess goals progression  [] Other:     ASSESSMENT:  ROM doing well today, abd and IR slightly limited, functional elevation improving. Progress as padilla   .     Return to Play: (if applicable)   []  Stage 1: Intro to Strength   []  Stage 2: Dynamic Strength and Intro to Plyometrics   []  Stage 3: Advanced Plyometrics and Intro to Throwing   []  Stage 4: Sport specific Training/Return to Sport     []  Ready to Return to Play, [employer]   []  Not Ready for Return to Sports   Comments:      Treatment/Activity Tolerance:  [x] Patient tolerated treatment well [] Patient limited by fatique  [] Patient limited by pain  [] Patient limited by other medical complications  [] Other:     Prognosis: [x] Good [] Fair  [] Poor    Patient Requires Follow-up: [x] Yes  [] No    PLAN: Cont 1-2 x per week for up to 5 weeks  [x] Continue per plan of care [] Alter current plan (see comments)  [] Plan of care initiated [] Hold pending MD visit [] Discharge    Electronically signed by: Keegna Chen PT

## 2018-07-18 ENCOUNTER — OFFICE VISIT (OUTPATIENT)
Dept: ORTHOPEDIC SURGERY | Age: 53
End: 2018-07-18

## 2018-07-18 ENCOUNTER — HOSPITAL ENCOUNTER (OUTPATIENT)
Dept: PHYSICAL THERAPY | Age: 53
Discharge: HOME | End: 2018-07-19
Admitting: ORTHOPAEDIC SURGERY

## 2018-07-18 DIAGNOSIS — M75.111 INCOMPLETE TEAR OF RIGHT ROTATOR CUFF: Primary | ICD-10-CM

## 2018-07-18 DIAGNOSIS — M19.019 ACROMIOCLAVICULAR JOINT ARTHRITIS: ICD-10-CM

## 2018-07-18 DIAGNOSIS — M75.41 SUBACROMIAL IMPINGEMENT, RIGHT: ICD-10-CM

## 2018-07-18 PROCEDURE — 99024 POSTOP FOLLOW-UP VISIT: CPT | Performed by: ORTHOPAEDIC SURGERY

## 2018-07-18 NOTE — FLOWSHEET NOTE
[address] [place] [address]  Phone: [phone] Fax: [phone]            Physical Therapy Daily Treatment Note  Date:  2018    Patient Name:  Jerson Babcock    :  1965  MRN: [id number]  Restrictions/Precautions:    Medical/Treatment Diagnosis Information:  · Diagnosis: Acute pain of right shoulder  - Primary   · Treatment Diagnosis: Acute pain of right shoulder  - Primary   Insurance/Certification information:     Physician Information:  Referring Practitioner: Dr Jamari Donaldson of care signed (Y/N):     Date of Patient follow up with Physician:     G-Code (if applicable):      Date G-Code Applied:    PT G-Codes  Functional Assessment Tool Used: DASH  Score: 11  Functional Limitation: Carrying, moving and handling objects  Carrying, Moving and Handling Objects Current Status (): At least 20 percent but less than 0 percent impaired, limited or restricted  Carrying, Moving and Handling Objects Goal Status (): At least 20 percent but less than 0 percent impaired, limited or restricted    Progress Note: [x]  Yes  []  No  Next due by: Visit #10      Latex Allergy:  [x]NO      []YES  Preferred Language for Healthcare:   [x]English       []other:    Visit #u   Insurance Allowable   14 20     Pain level:  0-4/10     SUBJECTIVE:  Patient reports that her shoulder is continuing to slowly make progress. Chief complaint is stiffness in the shoulder especially upon waking.       OBJECTIVE:   Observation:   Test measurements:  Active flexion 150 w compensatory hike,  PROM flex 155, ABd 120, ER 80, IR 34, functional IR to L3    RESTRICTIONS/PRECAUTIONS: R shoulder scope, aug repair, DCE, SAD 18    Exercises/Interventions:   Therapeutic Ex Sets/sec Reps Notes   UBE 5 min     Cane AAROM flex/press 1 20 Wand ER/flex   3 way Isomet T- band Row/pinch 1 20 3.0 pl   T- band lower pinch      T- band ER/IR activation 2 12 house/yardwork, driving/computer work  [] (15365) Reviewed/Progressed HEP activities related to improving balance, coordination, kinesthetic sense, posture, motor skill, proprioception of scapular, scapulothoracic and UE control with self care, reaching, carrying, lifting, house/yardwork, driving/computer work      Manual Treatments:  PROM / STM / Oscillations-Mobs:  G-I, II, III, IV (PA's, Inf., Post.)  [x] (51558) Provided manual therapy to mobilize soft tissue/joints of cervical/CT, scapular GHJ and UE for the purpose of modulating pain, promoting relaxation,  increasing ROM, reducing/eliminating soft tissue swelling/inflammation/restriction, improving soft tissue extensibility and allowing for proper ROM for normal function with self care, reaching, carrying, lifting, house/yardwork, driving/computer work    Modalities:      Charges:  Timed Code Treatment Minutes: 56   Total Treatment Minutes: 56     [] EVAL (LOW) 52295 (typically 20 minutes face-to-face)  [] EVAL (MOD) 27396 (typically 30 minutes face-to-face)  [] EVAL (HIGH) 11804 (typically 45 minutes face-to-face)  [] RE-EVAL     [x] BE(45750) x  3   [] IONTO  [] NMR (34765) x      [] VASO  [x] Manual ([id number]) x  1    [] Other:  [] TA x       [] Mech Traction (26989)  [] ES(attended) (35238)      [] ES (un) (55254):     GOALS:      GOALS:  Patient stated goal: back to normal (work and bike)    Therapist goals for Patient:   Short Term Goals: To be achieved in: 2 weeks  1. Independent in HEP and progression per patient tolerance, in order to prevent re-injury. 2. Patient will have a decrease in pain to facilitate improvement in movement, function, and ADLs as indicated by Functional Deficits. Long Term Goals: To be achieved in: 8 weeks  1. Disability index score of 20% or less for the DASH to assist with reaching prior level of function. MET  2.  Patient will demonstrate increased AROM to [facility] to allow for proper joint functioning as indicated by patients Functional Deficits. 75% MET  3. Patient will demonstrate an increase in Strength to good scapular and core control, w/in 5lbs HHD for UE to allow for proper functional mobility as indicated by patients Functional Deficits. 75% MET  4. Patient will return to reaching functional activities without increased symptoms or restriction. 75% MET  5. Return to work(patient specific functional goal)  Not MET       Progression Towards Functional goals:  [x] Patient is progressing as expected towards functional goals listed. [] Progression is slowed due to complexities listed. [] Progression has been slowed due to co-morbidities. [] Plan just implemented, too soon to assess goals progression  [] Other:     ASSESSMENT:  ROM doing well today, abd and IR slightly limited, functional elevation improving. Progress as padilla   .     Return to Play: (if applicable)   []  Stage 1: Intro to Strength   []  Stage 2: Dynamic Strength and Intro to Plyometrics   []  Stage 3: Advanced Plyometrics and Intro to Throwing   []  Stage 4: Sport specific Training/Return to Sport     []  Ready to Return to Play, [employer]   []  Not Ready for Return to Sports   Comments:      Treatment/Activity Tolerance:  [x] Patient tolerated treatment well [] Patient limited by fatique  [] Patient limited by pain  [] Patient limited by other medical complications  [] Other:     Prognosis: [x] Good [] Fair  [] Poor    Patient Requires Follow-up: [x] Yes  [] No    PLAN: Cont 1-2 x per week for up to 5 weeks  [x] Continue per plan of care [] Alter current plan (see comments)  [] Plan of care initiated [] Hold pending MD visit [] Discharge    Electronically signed by: Jania Ramirez PTA

## 2018-07-18 NOTE — PROGRESS NOTES
History of Present of Illness:  S/P Rotator Cuff Repair  The patient returns today for right shoulder evaluation 2 months after rotator cuff repair    Examination:  Inspection reveals warm, dry, intact skin. There is no adenopathy. The distal neurovascular exam is grossly intact. Examination of the contralateral shoulder reveals no atrophy or deformity. The skin is warm and dry. Range of motion is within normal limits. There is no focal tenderness with palpation. Provocative SLAP, biceps tension, apprehension AC joint or rotator cuff tests are negative. Strength is graded 5/5 in all muscle groups outside of the rotator cuff. Rotator cuff strength is intact. The distal neurovascular exam is grossly intact. Cervical spine: The skin is warm and dry. There is no swelling, warmth, or erythema. Range of motion is within normal limits. There is no paraspinal or spinous process tenderness. Spurling's sign is negative and did not produce shoulder pain. The distal neurovascular exam is grossly intact. Diagnostic Test Findings:    No orders of the defined types were placed in this encounter. Treatment Plan:  Continue physical therapy follow-up in another 4 weeks prior to her getting back to a labor job      Disclaimer: \"This note was dictated with voice recognition software. Though review and correction are routine, we apologize for any errors. \"

## 2018-07-24 ENCOUNTER — HOSPITAL ENCOUNTER (OUTPATIENT)
Dept: PHYSICAL THERAPY | Age: 53
Discharge: HOME | End: 2018-07-25
Admitting: ORTHOPAEDIC SURGERY

## 2018-07-24 NOTE — FLOWSHEET NOTE
Row/pinch 1 20 3.0 pl   T- band lower pinch      T- band ER/IR activation 2 12 blue   Supine balance point 60 sec 3 Red KB   SL ER/punch 3 8 3/2  lb   Prone Rows/HAB/ext      Seat Table slides/ 1 15 Elevated/ bilateral   Standing HAB 2 12 red   Biceps/Tri 1 15 8 lb/blue   KB carry 1 3 blue lb   IR strap st 30 sec 3    Ball rolls 90 deg Ball codmans    Lawnmower 1 20 5 lb   Multi angle body on arm flexion/abd 1 15    Tx open book st 1 10    tx clock rotion 1 6 cw/ccw   Quad flex/hab 2 5 ea   Scaption/abduction  1 10    D2 1 15 AROM   20 lb transfer 1 10                            Manual Intervention      Shld /GH Mobs      Post Cap mobs 3 min  Grade III/IV   Thoracic/Rib manipualtion      CT MT/Mobs      PROM MT 10min  R UE   Elbow mobs            NMR re-education      T-spine Ext      GH depress/compress      Scap/GH NMR      Body blade      Wall ball roll      Wall Ball bounce      Ball drops      Nikole Scap Bio          Therapeutic Exercise and NMR EXR  [x] (36019) Provided verbal/tactile cueing for activities related to strengthening, flexibility, endurance, ROM  for improvements in scapular, scapulothoracic and UE control with self care, reaching, carrying, lifting, house/yardwork, driving/computer work.    [] (10543) Provided verbal/tactile cueing for activities related to improving balance, coordination, kinesthetic sense, posture, motor skill, proprioception  to assist with  scapular, scapulothoracic and UE control with self care, reaching, carrying, lifting, house/yardwork, driving/computer work. Therapeutic Activities:    [x] (75957 or 79080) Provided verbal/tactile cueing for activities related to improving balance, coordination, kinesthetic sense, posture, motor skill, proprioception and motor activation to allow for proper function of scapular, scapulothoracic and UE control with self care, carrying, lifting, driving/computer work.      Home Exercise Program:    [x] (93422) Reviewed/Progressed HEP

## 2018-07-26 ENCOUNTER — HOSPITAL ENCOUNTER (OUTPATIENT)
Dept: PHYSICAL THERAPY | Age: 53
Discharge: HOME | End: 2018-07-27
Admitting: ORTHOPAEDIC SURGERY

## 2018-07-26 NOTE — FLOWSHEET NOTE
[address] [place] [address] [place] [address]  Phone: [phone] Fax: [phone]            Physical Therapy Daily Treatment Note  Date:  2018    Patient Name:  Germán Wiggins    :  1965  MRN: [id number]  Restrictions/Precautions:    Medical/Treatment Diagnosis Information:  · Diagnosis: Acute pain of right shoulder  - Primary   · Treatment Diagnosis: Acute pain of right shoulder  - Primary   Insurance/Certification information:     Physician Information:  Referring Practitioner: Dr Javier Shields of care signed (Y/N):     Date of Patient follow up with Physician:     G-Code (if applicable):      Date G-Code Applied:    PT G-Codes  Functional Assessment Tool Used: DASH  Score: 11  Functional Limitation: Carrying, moving and handling objects  Carrying, Moving and Handling Objects Current Status (): At least 20 percent but less than 0 percent impaired, limited or restricted  Carrying, Moving and Handling Objects Goal Status (): At least 20 percent but less than 0 percent impaired, limited or restricted    Progress Note: [x]  Yes  []  No  Next due by: Visit #10      Latex Allergy:  [x]NO      []YES  Preferred Language for Healthcare:   [x]English       []other:    Visit #u   Insurance Allowable   16 20     Pain level:  0-4/10     SUBJECTIVE:  Patient reports that her shoulder is continuing to slowly make progress. No soreness after last session, just usual morning stiffness.       OBJECTIVE:   Observation:   Test measurements:  Active flexion 150 w compensatory hike,  PROM flex 155, ABd 120, ER 80, IR 34, functional IR to L3    RESTRICTIONS/PRECAUTIONS: R shoulder scope, aug repair, DCE, SAD 18    Exercises/Interventions:   Therapeutic Ex Sets/sec Reps Notes   UBE 5 min     Cane AAROM flex/press 1 20 Wand ER/flex   3 way Isomet T- band Row/pinch 1 20 3.0 pl   T- band lower pinch      T- band ER/IR activation 2 12 blue Supine balance point 60 sec 3 Red KB   SL ER/punch 3 8 3/2  lb   Prone Rows/HAB/ext      Seat Table slides/ 1 15 Elevated/ bilateral   Standing HAB 2 12 red   Biceps/Tri 1 15 8 lb/blue   KB carry 1 3 blue lb   IR strap st 30 sec 3    Ball rolls 90 deg Ball codmans    Lawnmower 1 20 5 lb   Multi angle body on arm flexion/abd 1 15    Tx open book st 1 10    tx clock rotion 1 6 cw/ccw   Quad flex/hab 2 5 ea   Scaption/abduction  1 10    D2 1 15 AROM   20 lb transfer 1 10                            Manual Intervention      Shld /GH Mobs      Post Cap mobs 3 min  Grade III/IV   Thoracic/Rib manipualtion      CT MT/Mobs      PROM MT 10min  R UE   Elbow mobs            NMR re-education      T-spine Ext      GH depress/compress      Scap/GH NMR      Body blade      Wall ball roll      Wall Ball bounce      Ball drops      Nikole Scap Bio          Therapeutic Exercise and NMR EXR  [x] (93730) Provided verbal/tactile cueing for activities related to strengthening, flexibility, endurance, ROM  for improvements in scapular, scapulothoracic and UE control with self care, reaching, carrying, lifting, house/yardwork, driving/computer work.    [] (51638) Provided verbal/tactile cueing for activities related to improving balance, coordination, kinesthetic sense, posture, motor skill, proprioception  to assist with  scapular, scapulothoracic and UE control with self care, reaching, carrying, lifting, house/yardwork, driving/computer work. Therapeutic Activities:    [x] (52127 or 35247) Provided verbal/tactile cueing for activities related to improving balance, coordination, kinesthetic sense, posture, motor skill, proprioception and motor activation to allow for proper function of scapular, scapulothoracic and UE control with self care, carrying, lifting, driving/computer work.      Home Exercise Program:    [x] (19395) Reviewed/Progressed HEP activities related to strengthening, flexibility, endurance, ROM of scapular, AROM to [facility] to allow for proper joint functioning as indicated by patients Functional Deficits. 75% MET  3. Patient will demonstrate an increase in Strength to good scapular and core control, w/in 5lbs HHD for UE to allow for proper functional mobility as indicated by patients Functional Deficits. 75% MET  4. Patient will return to reaching functional activities without increased symptoms or restriction. 75% MET  5. Return to work(patient specific functional goal)  Not MET       Progression Towards Functional goals:  [x] Patient is progressing as expected towards functional goals listed. [] Progression is slowed due to complexities listed. [] Progression has been slowed due to co-morbidities. [] Plan just implemented, too soon to assess goals progression  [] Other:     ASSESSMENT:  ROM doing well today, Cont to load as padilla with specificity to occupational demands.         Return to Play: (if applicable)   []  Stage 1: Intro to Strength   []  Stage 2: Dynamic Strength and Intro to Plyometrics   []  Stage 3: Advanced Plyometrics and Intro to Throwing   []  Stage 4: Sport specific Training/Return to Sport     []  Ready to Return to Play, [employer]   []  Not Ready for Return to Sports   Comments:      Treatment/Activity Tolerance:  [x] Patient tolerated treatment well [] Patient limited by fatique  [] Patient limited by pain  [] Patient limited by other medical complications  [] Other:     Prognosis: [x] Good [] Fair  [] Poor    Patient Requires Follow-up: [x] Yes  [] No    PLAN: Cont 1-2 x per week for up to 5 weeks  [x] Continue per plan of care [] Alter current plan (see comments)  [] Plan of care initiated [] Hold pending MD visit [] Discharge    Electronically signed by: Cintia Calle PT

## 2018-07-31 ENCOUNTER — HOSPITAL ENCOUNTER (OUTPATIENT)
Dept: PHYSICAL THERAPY | Age: 53
Discharge: HOME | End: 2018-08-31
Admitting: ORTHOPAEDIC SURGERY

## 2018-07-31 NOTE — FLOWSHEET NOTE
[address] [place], [place] [address]  Phone: [phone] Fax: [phone]            Physical Therapy Daily Treatment Note  Date:  2018    Patient Name:  Mary Wiggins    :  1965  MRN: [id number]  Restrictions/Precautions:    Medical/Treatment Diagnosis Information:  · Diagnosis: Acute pain of right shoulder  - Primary   · Treatment Diagnosis: Acute pain of right shoulder  - Primary   Insurance/Certification information:     Physician Information:  Referring Practitioner: Dr Osman Shaw of care signed (Y/N):     Date of Patient follow up with Physician:     G-Code (if applicable):      Date G-Code Applied:    PT G-Codes  Functional Assessment Tool Used: DASH  Score: 11  Functional Limitation: Carrying, moving and handling objects  Carrying, Moving and Handling Objects Current Status (): At least 20 percent but less than 0 percent impaired, limited or restricted  Carrying, Moving and Handling Objects Goal Status (): At least 20 percent but less than 0 percent impaired, limited or restricted    Progress Note: [x]  Yes  []  No  Next due by: Visit #10      Latex Allergy:  [x]NO      []YES  Preferred Language for Healthcare:   [x]English       []other:    Visit #u   Insurance Allowable   17 20     Pain level:  0-4/10     SUBJECTIVE:  Patient reports that her shoulder is continuing to slowly make progress. No soreness after last session, just usual morning stiffness. Able to sleep on R shoulder at this point.        OBJECTIVE:   Observation:   Test measurements:  Active flexion 150 w compensatory hike,  PROM flex 155, ABd 120, ER 80, IR 34, functional IR to L3    RESTRICTIONS/PRECAUTIONS: R shoulder scope, aug repair, DCE, SAD 18    Exercises/Interventions:   Therapeutic Ex Sets/sec Reps Notes   UBE 5 min     Cane AAROM flex/press 1 20 Wand ER/flex   3 way Isomet T- band Row/pinch 1 20 3.0 pl   T- band lower pinch prior level of function. MET  2. Patient will demonstrate increased AROM to [facility] to allow for proper joint functioning as indicated by patients Functional Deficits. 75% MET  3. Patient will demonstrate an increase in Strength to good scapular and core control, w/in 5lbs HHD for UE to allow for proper functional mobility as indicated by patients Functional Deficits. 75% MET  4. Patient will return to reaching functional activities without increased symptoms or restriction. 75% MET  5. Return to work(patient specific functional goal)  Not MET       Progression Towards Functional goals:  [x] Patient is progressing as expected towards functional goals listed. [] Progression is slowed due to complexities listed. [] Progression has been slowed due to co-morbidities. [] Plan just implemented, too soon to assess goals progression  [] Other:     ASSESSMENT:  ROM doing well today, Cont to load as padilla with specificity to occupational demands.         Return to Play: (if applicable)   []  Stage 1: Intro to Strength   []  Stage 2: Dynamic Strength and Intro to Plyometrics   []  Stage 3: Advanced Plyometrics and Intro to Throwing   []  Stage 4: Sport specific Training/Return to Sport     []  Ready to Return to Play, [employer]   []  Not Ready for Return to Sports   Comments:      Treatment/Activity Tolerance:  [x] Patient tolerated treatment well [] Patient limited by fatique  [] Patient limited by pain  [] Patient limited by other medical complications  [] Other:     Prognosis: [x] Good [] Fair  [] Poor    Patient Requires Follow-up: [x] Yes  [] No    PLAN: Cont 1-2 x per week for up to 5 weeks  [x] Continue per plan of care [] Alter current plan (see comments)  [] Plan of care initiated [] Hold pending MD visit [] Discharge    Electronically signed by: Rachelle Almeida, PT

## 2018-08-01 ENCOUNTER — HOSPITAL ENCOUNTER (OUTPATIENT)
Dept: OTHER | Age: 53
Discharge: HOME | End: 2018-08-01
Attending: ORTHOPAEDIC SURGERY | Admitting: ORTHOPAEDIC SURGERY

## 2018-08-02 ENCOUNTER — HOSPITAL ENCOUNTER (OUTPATIENT)
Dept: PHYSICAL THERAPY | Age: 53
Discharge: HOME | End: 2018-08-03
Admitting: ORTHOPAEDIC SURGERY

## 2018-08-02 NOTE — FLOWSHEET NOTE
activation 2 12 blue   Supine balance point 60 sec 3 Red KB   SL ER/punch 3 8 3/2  lb   Prone Rows/HAB/ext      Seat Table slides/ 1 15 Elevated/ bilateral   Standing HAB 2 12 red   Biceps/Tri 1 15 8 lb/blue   KB carry 1 3 Black    IR strap st 30 sec 3    Ball rolls 90 deg Ball codmans    Lawnmower 5 lb   Multi angle body on arm flexion/abd 1 15    Tx open book st 1 10    tx clock rotation 1 6 cw/ccw   Quad flex/hab 2 5  2lb with HASB, ea   Scaption/abduction  1 10    D2 1 15 AROM   20 lb transfer 1 10    90/90 flexion iso 1 15 red         Prone IR st 4 min           Manual Intervention      Shld /GH Mobs      Post Cap mobs 3 min  Grade III/IV   Thoracic/Rib manipualtion      CT MT/Mobs      PROM MT 10min  R UE   Elbow mobs            NMR re-education      T-spine Ext      GH depress/compress      Scap/GH NMR      Body blade      Wall ball roll      Wall Ball bounce      Ball drops      Nikole Scap Bio          Therapeutic Exercise and NMR EXR  [x] (19257) Provided verbal/tactile cueing for activities related to strengthening, flexibility, endurance, ROM  for improvements in scapular, scapulothoracic and UE control with self care, reaching, carrying, lifting, house/yardwork, driving/computer work.    [] (89498) Provided verbal/tactile cueing for activities related to improving balance, coordination, kinesthetic sense, posture, motor skill, proprioception  to assist with  scapular, scapulothoracic and UE control with self care, reaching, carrying, lifting, house/yardwork, driving/computer work. Therapeutic Activities:    [x] (30723 or 23660) Provided verbal/tactile cueing for activities related to improving balance, coordination, kinesthetic sense, posture, motor skill, proprioception and motor activation to allow for proper function of scapular, scapulothoracic and UE control with self care, carrying, lifting, driving/computer work.      Home Exercise Program:    [x] (76539) Reviewed/Progressed HEP activities related to strengthening, flexibility, endurance, ROM of scapular, scapulothoracic and UE control with self care, reaching, carrying, lifting, house/yardwork, driving/computer work  [] (41883) Reviewed/Progressed HEP activities related to improving balance, coordination, kinesthetic sense, posture, motor skill, proprioception of scapular, scapulothoracic and UE control with self care, reaching, carrying, lifting, house/yardwork, driving/computer work      Manual Treatments:  PROM / STM / Oscillations-Mobs:  G-I, II, III, IV (PA's, Inf., Post.)  [x] (24485) Provided manual therapy to mobilize soft tissue/joints of cervical/CT, scapular GHJ and UE for the purpose of modulating pain, promoting relaxation,  increasing ROM, reducing/eliminating soft tissue swelling/inflammation/restriction, improving soft tissue extensibility and allowing for proper ROM for normal function with self care, reaching, carrying, lifting, house/yardwork, driving/computer work    Modalities:      Charges:  Timed Code Treatment Minutes: 49   Total Treatment Minutes: 49     [] EVAL (LOW) 18806 (typically 20 minutes face-to-face)  [] EVAL (MOD) 70283 (typically 30 minutes face-to-face)  [] EVAL (HIGH) 01191 (typically 45 minutes face-to-face)  [] RE-EVAL     [x] SD(88157) x  2   [] IONTO  [] NMR (58490) x      [] VASO  [x] Manual (77213) x  1    [] Other:  [] TA x       [] Mech Traction (12712)  [] ES(attended) (54163)      [] ES (un) (94768):     GOALS:      GOALS:  Patient stated goal: back to normal (work and bike)    Therapist goals for Patient:   Short Term Goals: To be achieved in: 2 weeks  1. Independent in HEP and progression per patient tolerance, in order to prevent re-injury. 2. Patient will have a decrease in pain to facilitate improvement in movement, function, and ADLs as indicated by Functional Deficits. Long Term Goals: To be achieved in: 8 weeks  1.  Disability index score of 20% or less for the DASH to assist with reaching prior level of function. MET  2. Patient will demonstrate increased AROM to [facility] to allow for proper joint functioning as indicated by patients Functional Deficits. 75% MET  3. Patient will demonstrate an increase in Strength to good scapular and core control, w/in 5lbs HHD for UE to allow for proper functional mobility as indicated by patients Functional Deficits. 75% MET  4. Patient will return to reaching functional activities without increased symptoms or restriction. 75% MET  5. Return to work(patient specific functional goal)  Not MET       Progression Towards Functional goals:  [x] Patient is progressing as expected towards functional goals listed. [] Progression is slowed due to complexities listed. [] Progression has been slowed due to co-morbidities. [] Plan just implemented, too soon to assess goals progression  [] Other:     ASSESSMENT:  ROM doing well today, Cont to load as padilla with specificity to occupational demands. Reduced intensity due to soreness today from last workout, patient reported leaving that her shoulder felt better than when she arrived.         Return to Play: (if applicable)   []  Stage 1: Intro to Strength   []  Stage 2: Dynamic Strength and Intro to Plyometrics   []  Stage 3: Advanced Plyometrics and Intro to Throwing   []  Stage 4: Sport specific Training/Return to Sport     []  Ready to Return to Play, [employer]   []  Not Ready for Return to Sports   Comments:      Treatment/Activity Tolerance:  [x] Patient tolerated treatment well [] Patient limited by fatique  [] Patient limited by pain  [] Patient limited by other medical complications  [] Other:     Prognosis: [x] Good [] Fair  [] Poor    Patient Requires Follow-up: [x] Yes  [] No    PLAN: Cont 1-2 x per week for up to 5 weeks  [x] Continue per plan of care [] Alter current plan (see comments)  [] Plan of care initiated [] Hold pending MD visit [] Discharge    Electronically signed by: Sukhdeep Caldwell PT

## 2018-08-09 ENCOUNTER — HOSPITAL ENCOUNTER (OUTPATIENT)
Dept: PHYSICAL THERAPY | Age: 53
Discharge: HOME | End: 2018-08-10
Admitting: ORTHOPAEDIC SURGERY

## 2018-08-09 NOTE — FLOWSHEET NOTE
[address] [place] [address] [place] [address]  Phone: [phone] Fax: [phone]            Physical Therapy Daily Treatment Note  Date:  2018    Patient Name:  Cher Lyle    :  1965  MRN: [id number]  Restrictions/Precautions:    Medical/Treatment Diagnosis Information:  · Diagnosis: Acute pain of right shoulder  - Primary   · Treatment Diagnosis: Acute pain of right shoulder  - Primary   Insurance/Certification information:     Physician Information:  Referring Practitioner: Dr Michael Jacobsen of care signed (Y/N):     Date of Patient follow up with Physician:     G-Code (if applicable):      Date G-Code Applied:    PT G-Codes  Functional Assessment Tool Used: DASH  Score: 11  Functional Limitation: Carrying, moving and handling objects  Carrying, Moving and Handling Objects Current Status (): At least 20 percent but less than 0 percent impaired, limited or restricted  Carrying, Moving and Handling Objects Goal Status (): At least 20 percent but less than 0 percent impaired, limited or restricted    Progress Note: [x]  Yes  []  No  Next due by: Visit #10      Latex Allergy:  [x]NO      []YES  Preferred Language for Healthcare:   [x]English       []other:    Visit #u   Insurance Allowable   20 20     Pain level:  0-4/10     SUBJECTIVE:  Patient reports that her shoulder is doing ok, still trying to improve IR. Mild muscular soreness after last session, but resolved today.     OBJECTIVE:   Observation:   Test measurements:  Active flexion 150 w compensatory hike,  PROM flex 155, ABd 120, ER 80, IR 34, functional IR to L3    RESTRICTIONS/PRECAUTIONS: R shoulder scope, aug repair, DCE, SAD 18    Exercises/Interventions:   Therapeutic Ex Sets/sec Reps Notes   UBE 5 min     Cane AAROM flex/press 1 20 Wand ER/flex   3 way Isomet T- band Row/pinch 3 10 3.5 pl   T- band lower pinch      T- band ER/IR activation 2 12 blue Supine balance point 60 sec 3 Red KB   SL ER/punch 3 8 4/3  lb   Seat Table slides/ 1 15 Elevated/ bilateral   Standing HAB 2 12 red   Biceps/Tri 1 15 8 lb/blue   KB carry 1 3 Black    IR strap st 30 sec 3    Ball rolls 90 deg Ball codmans    Lawnmower 5 lb   Multi angle body on arm flexion/abd 1 15    Tx open book st 1 10    tx clock rotation 1 6 cw/ccw   Quad flex/hab 2 5  2lb with HASB, ea   Scaption/abduction  1 10 2 lb   D2 1 15 AROM   30 lb transfer 1 10    90/90 flexion iso 1 15 red         Prone IR st 4 min           Manual Intervention      Shld /GH Mobs      Post Cap mobs 3 min  Grade III/IV   Thoracic/Rib manipualtion      CT MT/Mobs      PROM MT 10min  GISTM R UE   Elbow mobs            NMR re-education      T-spine Ext      GH depress/compress      Scap/GH NMR      Body blade      Wall ball roll      Wall Ball bounce      Ball drops      Nikole Scap Bio          Therapeutic Exercise and NMR EXR  [x] (20510) Provided verbal/tactile cueing for activities related to strengthening, flexibility, endurance, ROM  for improvements in scapular, scapulothoracic and UE control with self care, reaching, carrying, lifting, house/yardwork, driving/computer work.    [] (61965) Provided verbal/tactile cueing for activities related to improving balance, coordination, kinesthetic sense, posture, motor skill, proprioception  to assist with  scapular, scapulothoracic and UE control with self care, reaching, carrying, lifting, house/yardwork, driving/computer work. Therapeutic Activities:    [x] (36046 or 01179) Provided verbal/tactile cueing for activities related to improving balance, coordination, kinesthetic sense, posture, motor skill, proprioception and motor activation to allow for proper function of scapular, scapulothoracic and UE control with self care, carrying, lifting, driving/computer work.      Home Exercise Program:    [x] (25746) Reviewed/Progressed HEP activities related to strengthening, flexibility, endurance, ROM of scapular, scapulothoracic and UE control with self care, reaching, carrying, lifting, house/yardwork, driving/computer work  [] (11632) Reviewed/Progressed HEP activities related to improving balance, coordination, kinesthetic sense, posture, motor skill, proprioception of scapular, scapulothoracic and UE control with self care, reaching, carrying, lifting, house/yardwork, driving/computer work      Manual Treatments:  PROM / STM / Oscillations-Mobs:  G-I, II, III, IV (PA's, Inf., Post.)  [x] (01778) Provided manual therapy to mobilize soft tissue/joints of cervical/CT, scapular GHJ and UE for the purpose of modulating pain, promoting relaxation,  increasing ROM, reducing/eliminating soft tissue swelling/inflammation/restriction, improving soft tissue extensibility and allowing for proper ROM for normal function with self care, reaching, carrying, lifting, house/yardwork, driving/computer work    Modalities:      Charges:  Timed Code Treatment Minutes: 58   Total Treatment Minutes: 58     [] EVAL (LOW) 83231 (typically 20 minutes face-to-face)  [] EVAL (MOD) 88424 (typically 30 minutes face-to-face)  [] EVAL (HIGH) 99700 (typically 45 minutes face-to-face)  [] RE-EVAL     [x] XI(32800) x  3   [] IONTO  [] NMR (48763) x      [] VASO  [x] Manual (39530) x  1    [] Other:  [] TA x       [] Mech Traction (95234)  [] ES(attended) (97148)      [] ES (un) (49100):     GOALS:      GOALS:  Patient stated goal: back to normal (work and bike)    Therapist goals for Patient:   Short Term Goals: To be achieved in: 2 weeks  1. Independent in HEP and progression per patient tolerance, in order to prevent re-injury. 2. Patient will have a decrease in pain to facilitate improvement in movement, function, and ADLs as indicated by Functional Deficits. Long Term Goals: To be achieved in: 8 weeks  1. Disability index score of 20% or less for the DASH to assist with reaching prior level of function. MET  2.  Patient will demonstrate increased AROM to [facility] to allow for proper joint functioning as indicated by patients Functional Deficits. 75% MET  3. Patient will demonstrate an increase in Strength to good scapular and core control, w/in 5lbs HHD for UE to allow for proper functional mobility as indicated by patients Functional Deficits. 75% MET  4. Patient will return to reaching functional activities without increased symptoms or restriction. 75% MET  5. Return to work(patient specific functional goal)  Not MET       Progression Towards Functional goals:  [x] Patient is progressing as expected towards functional goals listed. [] Progression is slowed due to complexities listed. [] Progression has been slowed due to co-morbidities. [] Plan just implemented, too soon to assess goals progression  [] Other:     ASSESSMENT:  ROM doing well today, IR still limited. Cont to load as padilla with specificity to occupational demands. Cont IR st prone at home.           Return to Play: (if applicable)   []  Stage 1: Intro to Strength   []  Stage 2: Dynamic Strength and Intro to Plyometrics   []  Stage 3: Advanced Plyometrics and Intro to Throwing   []  Stage 4: Sport specific Training/Return to Sport     []  Ready to Return to Play, [employer]   []  Not Ready for Return to Sports   Comments:      Treatment/Activity Tolerance:  [x] Patient tolerated treatment well [] Patient limited by fatique  [] Patient limited by pain  [] Patient limited by other medical complications  [] Other:     Prognosis: [x] Good [] Fair  [] Poor    Patient Requires Follow-up: [x] Yes  [] No    PLAN: Cont 1-2 x per week for up to 5 weeks  [x] Continue per plan of care [] Alter current plan (see comments)  [] Plan of care initiated [] Hold pending MD visit [] Discharge    Electronically signed by: Shaquille Seymour PT

## 2018-08-14 ENCOUNTER — HOSPITAL ENCOUNTER (OUTPATIENT)
Dept: PHYSICAL THERAPY | Age: 53
Discharge: HOME | End: 2018-08-15
Admitting: ORTHOPAEDIC SURGERY

## 2018-08-14 NOTE — PLAN OF CARE
shoulder  - Primary   Insurance/Certification information:     Physician Information:  Referring Practitioner: Dr Rani Triplett of care signed (Y/N):     Date of Patient follow up with Physician:     G-Code (if applicable):      Date G-Code Applied:    PT G-Codes  Functional Assessment Tool Used: DASH  Score: 5%  Functional Limitation: Carrying, moving and handling objects  Carrying, Moving and Handling Objects Current Status (): At least 20 percent but less than 0 percent impaired, limited or restricted  Carrying, Moving and Handling Objects Goal Status (): At least 20 percent but less than 0 percent impaired, limited or restricted    Progress Note: [x]  Yes  []  No  Next due by: Visit #10      Latex Allergy:  [x]NO      []YES  Preferred Language for Healthcare:   [x]English       []other:    Visit #u   Insurance Allowable   21 [place]     Pain level:  0-4/10     SUBJECTIVE:  Patient reports that her shoulder is doing ok, still trying to improve IR. Mild stiffness but no real pain or soreness. She feels ready to return to work if released. She does not seem concerned and feels like she is able to be her duties.         OBJECTIVE:   ROM Left Right   Shoulder Flex 140 130 (140)   Shoulder Abd 140 120    Shoulder ER 65 60 (85)   Shoulder IR T12 L3 (25)                           Strength  Left Right   Shoulder Flex 9 lb 6 lb   Shoulder Scap 8 lb 5 lb   Shoulder ER 18 lb 14 lb   Shoulder IR 16 lb 16 lb                       RESTRICTIONS/PRECAUTIONS: R shoulder scope, aug repair, DCE, SAD 5/18/18    Exercises/Interventions:   Therapeutic Ex Sets/sec Reps Notes   UBE 5 min     Cane AAROM flex/press 1 20 Wand ER/flex   3 way Isomet T- band Row/pinch 3 10 3.5 pl   T- band lower pinch      T- band ER/IR activation 2 12 blue   Supine balance point 60 sec 3 Red KB   SL ER/punch 3 8 4/3  lb   Seat Table slides/ 1 15 Elevated/ bilateral   Standing HAB 2 12 red   Biceps/Tri 1 15 8 lb/blue   KB carry 1 3 Black    IR strap st 30 sec 3    Ball rolls 90 deg Ball [employer]    Lawnmower 5 lb   Multi angle body on arm flexion/abd 1 15    Tx open book st 1 10    tx clock rotation 1 6 cw/ccw   Quad flex/hab 2 5  2lb with HABs, ea   Scaption/abduction  1 10 2 lb   D2 1 15 AROM         30 lb transfer 1 10    90/90 flexion iso 1 15 red         Prone IR st 5 min           Manual Intervention      Shld /GH Mobs      Post Cap mobs 3 min  Grade III/IV   Thoracic/Rib manipualtion      CT MT/Mobs      PROM MT 10min  GISTM R UE   Elbow mobs            NMR re-education      T-spine Ext      GH depress/compress      Scap/GH NMR      Body blade      Wall ball roll      Wall Ball bounce      Ball drops      Nikole Scap Bio          Therapeutic Exercise and NMR EXR  [x] (28482) Provided verbal/tactile cueing for activities related to strengthening, flexibility, endurance, ROM  for improvements in scapular, scapulothoracic and UE control with self care, reaching, carrying, lifting, house/yardwork, driving/computer work.    [] (30141) Provided verbal/tactile cueing for activities related to improving balance, coordination, kinesthetic sense, posture, motor skill, proprioception  to assist with  scapular, scapulothoracic and UE control with self care, reaching, carrying, lifting, house/yardwork, driving/computer work. Therapeutic Activities:    [x] (94274 or 23620) Provided verbal/tactile cueing for activities related to improving balance, coordination, kinesthetic sense, posture, motor skill, proprioception and motor activation to allow for proper function of scapular, scapulothoracic and UE control with self care, carrying, lifting, driving/computer work.      Home Exercise Program:    [x] (18561) Reviewed/Progressed HEP activities related to strengthening, flexibility, endurance, ROM of scapular, scapulothoracic and UE control with self care, reaching, carrying, lifting, house/yardwork, driving/computer work  [] (25831) Reviewed/Progressed HEP activities

## 2018-08-15 ENCOUNTER — OFFICE VISIT (OUTPATIENT)
Dept: ORTHOPEDIC SURGERY | Age: 53
End: 2018-08-15

## 2018-08-15 VITALS — WEIGHT: 137 LBS | BODY MASS INDEX: 22.02 KG/M2 | HEIGHT: 66 IN

## 2018-08-15 DIAGNOSIS — M75.111 INCOMPLETE TEAR OF RIGHT ROTATOR CUFF: Primary | ICD-10-CM

## 2018-08-15 DIAGNOSIS — M19.011 ARTHRITIS OF RIGHT ACROMIOCLAVICULAR JOINT: ICD-10-CM

## 2018-08-15 DIAGNOSIS — M75.41 SUBACROMIAL IMPINGEMENT, RIGHT: ICD-10-CM

## 2018-08-15 PROCEDURE — 99024 POSTOP FOLLOW-UP VISIT: CPT | Performed by: ORTHOPAEDIC SURGERY

## 2018-08-28 ENCOUNTER — HOSPITAL ENCOUNTER (OUTPATIENT)
Dept: PHYSICAL THERAPY | Age: 53
Discharge: HOME | End: 2018-08-29
Admitting: ORTHOPAEDIC SURGERY

## 2018-08-28 NOTE — FLOWSHEET NOTE
[address] [place], [place] [address]  Phone: [phone] Fax: [phone]          Physical Therapy Daily Treatment Note  Date:  2018    Patient Name:  Javi De Oliveira    :  1965  MRN: [id number]  Restrictions/Precautions:    Medical/Treatment Diagnosis Information:  · Diagnosis: Acute pain of right shoulder  - Primary   · Treatment Diagnosis: Acute pain of right shoulder  - Primary   Insurance/Certification information:     Physician Information:  Referring Practitioner: Dr Guillermina Gandhi of care signed (Y/N):     Date of Patient follow up with Physician:     G-Code (if applicable):      Date G-Code Applied:    PT G-Codes  Functional Assessment Tool Used: DASH  Score: 5%  Functional Limitation: Carrying, moving and handling objects  Carrying, Moving and Handling Objects Current Status (): At least 20 percent but less than 0 percent impaired, limited or restricted  Carrying, Moving and Handling Objects Goal Status (): At least 20 percent but less than 0 percent impaired, limited or restricted    Progress Note: [x]  Yes  []  No  Next due by: Visit #10      Latex Allergy:  [x]NO      []YES  Preferred Language for Healthcare:   [x]English       []other:    Visit #u   Insurance Allowable   22 MN     Pain level:  0-4/10     SUBJECTIVE:  Patient reports that her shoulder is doing ok, still trying to improve IR. Back to work and overall is doing well from a functional perspective.  Notes difficulty stacking shingles    OBJECTIVE:   ROM Left Right   Shoulder Flex 140 130 (140)   Shoulder Abd 140 120    Shoulder ER 65 60 (85)   Shoulder IR T12 L3 (25)                           Strength  Left Right   Shoulder Flex 9 lb 6 lb   Shoulder Scap 8 lb 5 lb   Shoulder ER 18 lb 14 lb   Shoulder IR 16 lb 16 lb                       RESTRICTIONS/PRECAUTIONS: R shoulder scope, aug repair, DCE, SAD 18    Exercises/Interventions:

## 2018-09-01 ENCOUNTER — HOSPITAL ENCOUNTER (OUTPATIENT)
Dept: OTHER | Age: 53
Discharge: HOME | End: 2018-09-02
Attending: ORTHOPAEDIC SURGERY | Admitting: ORTHOPAEDIC SURGERY

## 2018-09-04 ENCOUNTER — HOSPITAL ENCOUNTER (OUTPATIENT)
Dept: PHYSICAL THERAPY | Age: 53
Discharge: HOME | End: 2018-09-05
Admitting: ORTHOPAEDIC SURGERY

## 2018-09-04 NOTE — FLOWSHEET NOTE
work.     Home Exercise Program:    [x] (79334) Reviewed/Progressed HEP activities related to strengthening, flexibility, endurance, ROM of scapular, scapulothoracic and UE control with self care, reaching, carrying, lifting, house/yardwork, driving/computer work  [] (64785) Reviewed/Progressed HEP activities related to improving balance, coordination, kinesthetic sense, posture, motor skill, proprioception of scapular, scapulothoracic and UE control with self care, reaching, carrying, lifting, house/yardwork, driving/computer work      Manual Treatments:  PROM / STM / Oscillations-Mobs:  G-I, II, III, IV (PA's, Inf., Post.)  [x] (33075) Provided manual therapy to mobilize soft tissue/joints of cervical/CT, scapular GHJ and UE for the purpose of modulating pain, promoting relaxation,  increasing ROM, reducing/eliminating soft tissue swelling/inflammation/restriction, improving soft tissue extensibility and allowing for proper ROM for normal function with self care, reaching, carrying, lifting, house/yardwork, driving/computer work    Modalities:      Charges:  Timed Code Treatment Minutes: 45   Total Treatment Minutes: 45     [] EVAL (LOW) 19854 (typically 20 minutes face-to-face)  [] EVAL (MOD) 04699 (typically 30 minutes face-to-face)  [] EVAL (HIGH) 29793 (typically 45 minutes face-to-face)  [] RE-EVAL     [x] CA(76598) x  2   [] IONTO  [] NMR (51099) x      [] VASO  [x] Manual (85231) x  1    [] Other:  [] TA x       [] Mech Traction (41203)  [] ES(attended) (58339)      [] ES (un) (77393):     GOALS:      GOALS:  Patient stated goal: back to normal (work and bike)    Therapist goals for Patient:   Short Term Goals: To be achieved in: 2 weeks  1. Independent in HEP and progression per patient tolerance, in order to prevent re-injury. 2. Patient will have a decrease in pain to facilitate improvement in movement, function, and ADLs as indicated by Functional Deficits. Long Term Goals:  To be achieved in: 8 weeks  1. Disability index score of 20% or less for the DASH to assist with reaching prior level of function. MET  2. Patient will demonstrate [facility] to allow for proper joint functioning as indicated by patients Functional Deficits. 85% MET  3. Patient will demonstrate an increase in Strength to good scapular and core control, w/in 5lbs HHD for UE to allow for proper functional mobility as indicated by patients Functional Deficits. 75% MET  4. Patient will return to reaching functional activities without increased symptoms or restriction. 75% MET  5. Return to work(patient specific functional goal)  Not MET       Progression Towards Functional goals:  [x] Patient is progressing as expected towards functional goals listed. [] Progression is slowed due to complexities listed. [] Progression has been slowed due to co-morbidities. [] Plan just implemented, too soon to assess goals progression  [] Other:     ASSESSMENT:  Patient ROM doing fair, IR is still functionally limited. But slowly improving. Good session today.         Return to Play: (if applicable)   []  Stage 1: Intro to Strength   []  Stage 2: Dynamic Strength and Intro to Plyometrics   []  Stage 3: Advanced Plyometrics and Intro to Throwing   []  Stage 4: Sport specific Training/Return to Sport     []  Ready to Return to Play, [employer]   []  Not Ready for Return to Sports   Comments:      Treatment/Activity Tolerance:  [x] Patient tolerated treatment well [] Patient limited by fatique  [] Patient limited by pain  [] Patient limited by other medical complications  [] Other:     Prognosis: [x] Good [] Fair  [] Poor    Patient Requires Follow-up: [x] Yes  [] No    PLAN: Cont 1-x per week for up to 4 weeks  [x] Continue per plan of care [] Alter current plan (see comments)  [] Plan of care initiated [] Hold pending MD visit [] Discharge    Electronically signed by: Mikki Ornelas PT

## 2018-11-07 ENCOUNTER — OFFICE VISIT (OUTPATIENT)
Dept: ORTHOPEDIC SURGERY | Age: 53
End: 2018-11-07
Payer: COMMERCIAL

## 2018-11-07 VITALS — BODY MASS INDEX: 22 KG/M2 | HEIGHT: 66 IN | WEIGHT: 136.91 LBS

## 2018-11-07 DIAGNOSIS — M75.111 INCOMPLETE TEAR OF RIGHT ROTATOR CUFF: Primary | ICD-10-CM

## 2018-11-07 DIAGNOSIS — M19.011 ARTHRITIS OF RIGHT ACROMIOCLAVICULAR JOINT: ICD-10-CM

## 2018-11-07 DIAGNOSIS — M75.41 SUBACROMIAL IMPINGEMENT, RIGHT: ICD-10-CM

## 2018-11-07 PROCEDURE — 99213 OFFICE O/P EST LOW 20 MIN: CPT | Performed by: ORTHOPAEDIC SURGERY

## 2018-11-07 NOTE — PROGRESS NOTES
palpation  No pain Posterior to palpation  No trapezial pain to palpation  Range of Motion:  Abduction --150 degrees  Flexion-- 180 degrees  Extension-- between 45-60 degrees  Latera/external  rotation --close to 90 degrees  Medial/ internal rotation -- between 70-90 degrees    Strength:  Right shoulder strength:   internal rotation against resistance is 5/5  external rotation against resistance is 5/5  and supraspinatus isolation against resistance is 5/5, Shoulder shrug is 5 over 5 , cervical spine strength is excellent, flexion extension at the elbow is 5 over 5 wrist and hand strength is equal bilaterally with supination pronation and flexion and extension  no winging no muscle atrophy. Special Tests: Palpation demonstrates no swelling no effusion no pain. There is full active and passive range of motion bilaterally. Strength is excellent with internal rotation against resistance external rotation against resistance supraspinatus isolation against resistance. Shoulder shrug strength is 5 over 5 equal bilaterally. Radial ulnar and median nerve function is intact. Capillary refill is brisk. Elbow motion finger and wrist motion is full equal bilaterally. Deep tendon reflexes of the Brachial radialis, biceps, tricepsAre all +2/4 equal bilaterally. Cervical spine range of motion is full without pain negative Spurling's test.  Load-and-shift test is negative. Crank test is negative. Apprehension and relocation is negative. Anterior and posterior glide are equal bilaterally. Negative sulcus sign. No signs of any significant multidirectional instability. There is no scapular winging. There is no muscle atrophy of the latissimus dorsi, the deltoid, the periscapular musculature,The trapezius musculature or the pectoralis musculature. Negative Neer's test, negative Howard test, no pain with crossarm elevation. Contralateral Shoulder exam: Palpation demonstrates no swelling no effusion no pain. There is full active and passive range of motion bilaterally. Strength is excellent with internal rotation against resistance external rotation against resistance supraspinatus isolation against resistance. Shoulder shrug strength is 5 over 5 equal bilaterally. Radial ulnar and median nerve function is intact. Capillary refill is brisk. Elbow motion finger and wrist motion is full equal bilaterally. Deep tendon reflexes of the Brachial radialis, biceps, tricepsAre all +2/4 equal bilaterally. Cervical spine range of motion is full without pain negative Spurling's test.  Load-and-shift test is negative. Crank test is negative. Apprehension and relocation is negative. Anterior and posterior glide are equal bilaterally. Negative sulcus sign. No signs of any significant multidirectional instability. There is no scapular winging. There is no muscle atrophy of the latissimus dorsi, the deltoid, the periscapular musculature,The trapezius musculature or the pectoralis musculature. Negative Neer's test, negative Howard test, no pain with crossarm elevation. Abduction --150 degrees  Flexion-- 180 degrees  Extension-- between 45-60 degrees  Latera/external  rotation --close to 90 degrees  Medial/ internal rotation -- between 70-90 degree      Reflex: upper extremity: Deep tendon reflexes of the biceps, triceps, brachioradialis +2/4 equal bilaterally  Lower extremity: +2/4 and equal bilaterally for patella and Achilles    Additional Comments:       Cervical spine exam demonstrates no  Radiculopathy no reproduction of the symptomology. Range of motion is normal without pain or radiculopathy and does not cause shoulder pain. Additional Examinations:  Right Lower Extremity: Examination of the right lower extremity does not show any tenderness, deformity or injury. Range of motion is unremarkable. There is no gross instability. There are no rashes, ulcerations or lesions.   Strength and tone are

## 2019-01-10 NOTE — LETTER
[facility] Rheumatology  [address] [address]  Phone: [phone]  Fax: [phone]    Rebeka Velasquez MD        October 2, 2017     [facility], [address], [address] [place] [address]    Patient: Joseph Gallegos  MR Number: [id number]  YOB: 1965  Date of Visit: 10/2/2017    Dear Dr. Griselda Pulliam:    Thank you for the request for consultation for Rossy Lara to me for the evaluation of Polyarthralgia. Below are the relevant portions of my assessment and plan of care. ASSESSMENT AND PLAN      Assessment/Plan:      ASSESSMENT:    1. Polyarthralgia    2. Polymyalgia rheumatica ([address]        PLAN:     Lane Rust was seen today for new patient. Diagnoses and all orders for this visit:    Richardjesus Abler did not appreciate any swelling on the exam.  She has osteoarthritic changes in hands and knees. Most likely diagnosis osteoarthritis involving multiple joints. I doubt she has rheumatoid arthritis or systemic inflammatory disease.  -I will do workup to completely rule out rheumatoid arthritis  -I will check x-rays of hands and knees  -I will continue meloxicam 15 mg per day    -     XR Hand Bilateral Minimum 3 VW; Future  -     XR Knee Left Ap Lateral and Oblique; Future  -     XR Knee Right Ap Lateral and Oblique; Future  -     Cyclic Citrul Peptide Antibody, IgG; Future  -     Hepatitis B Core Antibody, IgM; Future  -     Hepatitis B Surface Antigen; Future  -     Hepatitis C Antibody; Future  -     Rheumatoid Factor; Future  -     Comprehensive Metabolic Panel; Future    Polymyalgia rheumatica ([address]- she is currently on prednisone 1 mg per day managed by her primary care physician. She denies any headaches, vision changes, jaw pain or extremity weakness.   Recent ESR and CRP normal.  -She will follow up with primary care physician for further management of PMR The patient indicates understanding of these issues and agrees with the plan. Return in about 6 weeks (around 11/13/2017). The risks and benefits of my recommendations, as well as other treatment options, benefits and side effects were discussed with the patient. All questions were answered. I reviewed patient's history, referral documents and electronic medical records  Copy of consult note is being routed electronically/faxed to referring physician           If you have questions, please do not hesitate to call me. I look forward to following Angel Perez along with you.     Sincerely,        Taz Villanueva MD biphasic

## 2021-04-23 ENCOUNTER — TELEPHONE (OUTPATIENT)
Dept: BARIATRICS/WEIGHT MGMT | Age: 56
End: 2021-04-23

## 2021-04-23 NOTE — TELEPHONE ENCOUNTER
Oneil Moe is inviting you to a scheduled Zoom meeting. Topic: Domingo Bradford Zoom Meeting  Time: Apr 28, 2021 09:00 AM [place] Time ( and [place] ([facility]))    NOE  [email or web address]. [facility]. [email or web address]    Meeting ID: [id number]  Password: [id number]  One tap mobile  [phone],,[phone]# [place] ([address] [place] [address])  [phone],,[phone],,0#,[id number]# [address] [facility] [facility])    Dial by your location          +7  [place] ([address] [facility])          +1 [address] [facility])          +2  [place] ([address] [facility])          +1 [address] [facility])          +1 [phone] [facility])          [phone] [facility] [address] [facility] [place] [place] [address] [place] [address] [place] [address]  Meeting ID: [id number]  Password: [id number]  Find your local number: [email or web address] [facility]. [email or web address]    Join by [employer]  [email or web address]    Join by [employer]  [de-identified] [phone])  [de-identified] ([address])  [de-identified] ([place])  [de-identified] ([address])  [de-identified] ([address])  [de-identified] [place])  [de-identified] [place])  [de-identified] ([address])  [phone] ([facility] 12)  [de-identified] [place])  [de-identified] [place] citizen[facility])  [de-identified] ([address])  [de-identified] [facility])  [de-identified] ([address])  Meeting ID: [phone]: [id number]

## 2022-11-15 ENCOUNTER — OFFICE VISIT (OUTPATIENT)
Dept: ORTHOPEDIC SURGERY | Age: 57
End: 2022-11-15
Payer: COMMERCIAL

## 2022-11-15 VITALS — HEIGHT: 65 IN | WEIGHT: 136 LBS | BODY MASS INDEX: 22.66 KG/M2

## 2022-11-15 DIAGNOSIS — M25.561 RIGHT KNEE PAIN, UNSPECIFIED CHRONICITY: Primary | ICD-10-CM

## 2022-11-15 PROCEDURE — 20610 DRAIN/INJ JOINT/BURSA W/O US: CPT | Performed by: ORTHOPAEDIC SURGERY

## 2022-11-15 PROCEDURE — 99204 OFFICE O/P NEW MOD 45 MIN: CPT | Performed by: ORTHOPAEDIC SURGERY

## 2022-11-15 RX ORDER — METHYLPREDNISOLONE 4 MG/1
TABLET ORAL
Qty: 1 KIT | Refills: 0 | Status: SHIPPED | OUTPATIENT
Start: 2022-11-15

## 2022-11-15 RX ORDER — BUPIVACAINE HYDROCHLORIDE 5 MG/ML
4 INJECTION, SOLUTION PERINEURAL ONCE
Status: COMPLETED | OUTPATIENT
Start: 2022-11-15 | End: 2022-11-15

## 2022-11-15 RX ORDER — METHYLPREDNISOLONE ACETATE 40 MG/ML
40 INJECTION, SUSPENSION INTRA-ARTICULAR; INTRALESIONAL; INTRAMUSCULAR; SOFT TISSUE ONCE
Status: COMPLETED | OUTPATIENT
Start: 2022-11-15 | End: 2022-11-15

## 2022-11-15 RX ADMIN — BUPIVACAINE HYDROCHLORIDE 20 MG: 5 INJECTION, SOLUTION PERINEURAL at 13:46

## 2022-11-15 RX ADMIN — METHYLPREDNISOLONE ACETATE 40 MG: 40 INJECTION, SUSPENSION INTRA-ARTICULAR; INTRALESIONAL; INTRAMUSCULAR; SOFT TISSUE at 13:46

## 2022-11-22 NOTE — PROGRESS NOTES
11/15/2022     Reason for visit:  Right knee pain    History of Present Illness: The patient is a 61-year-old female who presents for evaluation of her right knee. She reports on and off pain for several years with recent worsening. The pain is mostly anterior and deep. Is made worse with bending, kneeling, stairs. Occasional swelling. No catching or locking. She did undergo arthroscopy about 10 years ago.     Medical History:  Past Medical History:   Diagnosis Date    Polymyalgia rheumatica (HCC)     Rotator cuff impingement syndrome of right shoulder     Rotator cuff tear     right    Thyroid disease       Past Surgical History:   Procedure Laterality Date    HERNIA REPAIR      umbilical    KNEE ARTHROSCOPY  01/17/2012    RIGHT, WITH MICROFRACTURE OF FEMORAL CHONDYLE, DEBRIDEMENT OF BONE SPURS    SHOULDER ARTHROSCOPY Right 05/18/2018    RIGHT SHOULDER ARTHROSCOPY, SUBACROMIAL DECOMPRESSION, DISTAL CLAVICLE EXCIISON, SYNOVECTOMY, ROTATOR CUFF REPAIR WITH REGENTEN PATCH IMPLANT, AUGMENTATION AND LABRAL DEBRIDMENT    TONSILLECTOMY      UMBILICAL HERNIA REPAIR        Family History   Problem Relation Age of Onset    Cancer Mother     Cancer Father     Cancer Sister       Social History     Socioeconomic History    Marital status:      Spouse name: Not on file    Number of children: Not on file    Years of education: Not on file    Highest education level: Not on file   Occupational History    Not on file   Tobacco Use    Smoking status: Every Day     Packs/day: 1.00     Years: 20.00     Pack years: 20.00     Types: Cigarettes    Smokeless tobacco: Never   Substance and Sexual Activity    Alcohol use: No    Drug use: No    Sexual activity: Not on file   Other Topics Concern    Not on file   Social History Narrative    Not on file     Social Determinants of Health     Financial Resource Strain: Not on file   Food Insecurity: Not on file   Transportation Needs: Not on file   Physical Activity: Not on file   Stress: Not on file   Social Connections: Not on file   Intimate Partner Violence: Not on file   Housing Stability: Not on file      Current Outpatient Medications on File Prior to Visit   Medication Sig Dispense Refill    Acetaminophen (TYLENOL EXTRA STRENGTH PO) Take 1,000 mg by mouth 4 times daily as needed      KRILL OIL PO Take 1 tablet by mouth daily Dose unknown      meloxicam (MOBIC) 15 MG tablet Take 1 tablet by mouth daily 30 tablet 3    Multiple Vitamins-Minerals (THERAPEUTIC MULTIVITAMIN-MINERALS) tablet Take 1 tablet by mouth daily      SYNTHROID 125 MCG tablet TAKE 1 TABLET DAILY  5     No current facility-administered medications on file prior to visit. No Known Allergies     Review of Systems:  Constitutional: Patient is adequately groomed with no evidence of malnutrition  Mental Status: The patient is oriented to time, place and person. The patient's mood and affect are appropriate. Lymphatic: The lymphatic examination bilaterally reveals all areas to be without enlargement or induration. Vascular: Examination reveals no swelling or calf tenderness. Peripheral pulses are palpable and 2+. Neurological: The patient has good coordination. There is no weakness or sensory deficit. Skin:  Head/Neck: inspection reveals no rashes, ulcerations or lesions. Trunk: inspection reveals no rashes, ulcerations or lesions.     Objective:  Ht 5' 5\" (1.651 m)   Wt 136 lb (61.7 kg)   BMI 22.63 kg/m²      Physical Exam:  The patient is well-appearing and in no apparent distress  Examination of the right knee   There is a small effusion, no gross deformity or skin changes  Range of motion reveals 0 to 125  Neg lachman, negative posterior drawer, no pain or laxity with varus or valgus stress at 0 degrees and 30 degrees of flexion  No joint line tenderness  5 out of 5 strength throughout distal muscle groups  Sensation is intact to light touch throughout all distributions  There is no calf swelling or tenderness  Palpable DP pulse, brisk cap refill, 2+ symmetric reflexes     Imagin view x-rays of the right knee were obtained in office today on 11/15/2022 and reviewed. There is no fracture or dislocation. Complete patellofemoral joint space loss is noted. Assessment:  Right knee degenerative joint disease predominate involving the patellofemoral joint    Plan:  I discussed with the patient the diagnosis and treatment options. We discussed operative and nonoperative management. At this point I do recommend nonoperative management. Nonoperative treatment options include activity modification, anti-inflammatory medications, physical therapy, and injections. The patient elected proceed with cortisone injection. Therefore injection was given to the right knee via sterile technique. The injection consisted of 40 mg of Depo-Medrol combined with 4 mL of 0.5% Marcaine. The patient tolerated this well. We will also give a Medrol Dosepak. The patient will return to see us as needed. In the future we could repeat cortisone or proceed with hyaluronic acid. Alternatively we could obtain an MRI determine whether or not she may be a candidate for patellofemoral versus total knee arthroplasty    Greater than 45 minutes were spent with this encounter. Time spent included evaluating the patient's chart prior to arrival.  Evaluating the patient in the office including history, physical examination, imaging reviewing, and counseling on next steps. Lastly, time was spent discussing orders with my staff as well as providing documentation in the chart. Perla Garza MD            Orthopaedic Surgery Sports Medicine and [address] [facility] and [address] [facility] Physician [facility] ([place]      Disclaimer: This note was dictated with voice recognition software.   Though review and correction are routine, we apologize for any errors.